# Patient Record
Sex: MALE | Race: WHITE | Employment: FULL TIME | URBAN - METROPOLITAN AREA
[De-identification: names, ages, dates, MRNs, and addresses within clinical notes are randomized per-mention and may not be internally consistent; named-entity substitution may affect disease eponyms.]

---

## 2017-05-01 PROBLEM — G25.0 ESSENTIAL TREMOR: Status: ACTIVE | Noted: 2017-05-01

## 2017-05-01 PROCEDURE — 36415 COLL VENOUS BLD VENIPUNCTURE: CPT | Performed by: INTERNAL MEDICINE

## 2017-05-01 PROCEDURE — 84402 ASSAY OF FREE TESTOSTERONE: CPT | Performed by: INTERNAL MEDICINE

## 2017-05-01 PROCEDURE — 80061 LIPID PANEL: CPT | Performed by: INTERNAL MEDICINE

## 2017-05-01 PROCEDURE — 84403 ASSAY OF TOTAL TESTOSTERONE: CPT | Performed by: INTERNAL MEDICINE

## 2017-05-01 PROCEDURE — 84153 ASSAY OF PSA TOTAL: CPT | Performed by: INTERNAL MEDICINE

## 2017-05-01 PROCEDURE — 80050 GENERAL HEALTH PANEL: CPT | Performed by: INTERNAL MEDICINE

## 2017-07-17 PROBLEM — G47.30 SLEEP APNEA, UNSPECIFIED TYPE: Status: ACTIVE | Noted: 2017-07-17

## 2017-07-20 PROBLEM — M47.812 NECK ARTHRITIS: Status: ACTIVE | Noted: 2017-07-20

## 2017-07-20 PROBLEM — G56.21: Status: ACTIVE | Noted: 2017-07-20

## 2017-07-20 PROBLEM — M54.12 CERVICAL RADICULOPATHY: Status: ACTIVE | Noted: 2017-07-20

## 2017-12-01 PROCEDURE — 88305 TISSUE EXAM BY PATHOLOGIST: CPT | Performed by: INTERNAL MEDICINE

## 2018-07-25 PROBLEM — R97.20 ELEVATED PSA: Status: ACTIVE | Noted: 2018-07-25

## 2018-08-28 PROCEDURE — 84154 ASSAY OF PSA FREE: CPT | Performed by: UROLOGY

## 2018-08-28 PROCEDURE — 36415 COLL VENOUS BLD VENIPUNCTURE: CPT | Performed by: UROLOGY

## 2018-08-28 PROCEDURE — 84153 ASSAY OF PSA TOTAL: CPT | Performed by: UROLOGY

## 2018-12-17 ENCOUNTER — HOSPITAL ENCOUNTER (EMERGENCY)
Facility: HOSPITAL | Age: 53
Discharge: HOME | End: 2018-12-17
Attending: EMERGENCY MEDICINE
Payer: OTHER MISCELLANEOUS

## 2018-12-17 VITALS
SYSTOLIC BLOOD PRESSURE: 126 MMHG | RESPIRATION RATE: 18 BRPM | OXYGEN SATURATION: 97 % | BODY MASS INDEX: 31.66 KG/M2 | HEIGHT: 76 IN | TEMPERATURE: 99 F | DIASTOLIC BLOOD PRESSURE: 70 MMHG | HEART RATE: 74 BPM | WEIGHT: 260 LBS

## 2018-12-17 DIAGNOSIS — S01.01XA SCALP LACERATION, INITIAL ENCOUNTER: Primary | ICD-10-CM

## 2018-12-17 PROCEDURE — 99283 EMERGENCY DEPT VISIT LOW MDM: CPT

## 2018-12-17 PROCEDURE — 90471 IMMUNIZATION ADMIN: CPT

## 2018-12-17 PROCEDURE — 12001 RPR S/N/AX/GEN/TRNK 2.5CM/<: CPT

## 2018-12-17 NOTE — ED INITIAL ASSESSMENT (HPI)
Patient presents to ER via medics with c/o laceration to head. Patient had stood up and hit a shelf. Denies LOC/use of blood thinners.

## 2018-12-18 NOTE — ED PROVIDER NOTES
Patient Seen in: [facility] AND [facility] Emergency Department    History   Patient presents with:  Laceration Abrasion (integumentary)    Stated Complaint: head injury    HPI    The patient is a 24-year-old male who was at work when he stood up suddenly striki Neck: Normal range of motion. Neck supple. No spinous process tenderness and no muscular tenderness present. Normal range of motion present. Musculoskeletal: Normal range of motion. Neurological: He is alert and oriented to person, place, and time.  He

## 2019-07-26 ENCOUNTER — OFFICE VISIT (OUTPATIENT)
Dept: INTERNAL MEDICINE CLINIC | Facility: CLINIC | Age: 54
End: 2019-07-26
Payer: COMMERCIAL

## 2019-07-26 ENCOUNTER — LAB ENCOUNTER (OUTPATIENT)
Dept: LAB | Age: 54
End: 2019-07-26
Attending: INTERNAL MEDICINE
Payer: COMMERCIAL

## 2019-07-26 VITALS
DIASTOLIC BLOOD PRESSURE: 80 MMHG | BODY MASS INDEX: 33.73 KG/M2 | HEART RATE: 74 BPM | SYSTOLIC BLOOD PRESSURE: 120 MMHG | TEMPERATURE: 99 F | WEIGHT: 277 LBS | HEIGHT: 75.9 IN | OXYGEN SATURATION: 98 %

## 2019-07-26 DIAGNOSIS — R97.20 ELEVATED PSA: ICD-10-CM

## 2019-07-26 DIAGNOSIS — Z00.00 PHYSICAL EXAM: ICD-10-CM

## 2019-07-26 DIAGNOSIS — E66.9 CLASS 1 OBESITY WITHOUT SERIOUS COMORBIDITY WITH BODY MASS INDEX (BMI) OF 33.0 TO 33.9 IN ADULT, UNSPECIFIED OBESITY TYPE: ICD-10-CM

## 2019-07-26 DIAGNOSIS — Z00.00 PHYSICAL EXAM: Primary | ICD-10-CM

## 2019-07-26 DIAGNOSIS — G47.30 SLEEP APNEA, UNSPECIFIED TYPE: ICD-10-CM

## 2019-07-26 DIAGNOSIS — G25.0 ESSENTIAL TREMOR: ICD-10-CM

## 2019-07-26 DIAGNOSIS — M54.12 CERVICAL RADICULOPATHY: ICD-10-CM

## 2019-07-26 PROBLEM — G47.33 OBSTRUCTIVE APNEA: Status: ACTIVE | Noted: 2017-06-28

## 2019-07-26 LAB
ALBUMIN SERPL-MCNC: 4 G/DL (ref 3.4–5)
ALBUMIN/GLOB SERPL: 1.4 {RATIO} (ref 1–2)
ALP LIVER SERPL-CCNC: 61 U/L (ref 45–117)
ALT SERPL-CCNC: 35 U/L (ref 16–61)
ANION GAP SERPL CALC-SCNC: 9 MMOL/L (ref 0–18)
AST SERPL-CCNC: 18 U/L (ref 15–37)
BASOPHILS # BLD AUTO: 0.02 X10(3) UL (ref 0–0.2)
BASOPHILS NFR BLD AUTO: 0.4 %
BILIRUB SERPL-MCNC: 0.4 MG/DL (ref 0.1–2)
BUN BLD-MCNC: 19 MG/DL (ref 7–18)
BUN/CREAT SERPL: 15.2 (ref 10–20)
CALCIUM BLD-MCNC: 9.3 MG/DL (ref 8.5–10.1)
CHLORIDE SERPL-SCNC: 109 MMOL/L (ref 98–112)
CHOLEST SMN-MCNC: 174 MG/DL (ref ?–200)
CO2 SERPL-SCNC: 26 MMOL/L (ref 21–32)
CREAT BLD-MCNC: 1.25 MG/DL (ref 0.7–1.3)
DEPRECATED RDW RBC AUTO: 41.6 FL (ref 35.1–46.3)
EOSINOPHIL # BLD AUTO: 0.09 X10(3) UL (ref 0–0.7)
EOSINOPHIL NFR BLD AUTO: 1.9 %
ERYTHROCYTE [DISTWIDTH] IN BLOOD BY AUTOMATED COUNT: 12.3 % (ref 11–15)
GLOBULIN PLAS-MCNC: 2.9 G/DL (ref 2.8–4.4)
GLUCOSE BLD-MCNC: 110 MG/DL (ref 70–99)
HCT VFR BLD AUTO: 40.8 % (ref 39–53)
HDLC SERPL-MCNC: 40 MG/DL (ref 40–59)
HGB BLD-MCNC: 13.4 G/DL (ref 13–17.5)
IMM GRANULOCYTES # BLD AUTO: 0.01 X10(3) UL (ref 0–1)
IMM GRANULOCYTES NFR BLD: 0.2 %
LDLC SERPL CALC-MCNC: 113 MG/DL (ref ?–100)
LYMPHOCYTES # BLD AUTO: 1.32 X10(3) UL (ref 1–4)
LYMPHOCYTES NFR BLD AUTO: 27.6 %
M PROTEIN MFR SERPL ELPH: 6.9 G/DL (ref 6.4–8.2)
MCH RBC QN AUTO: 30.2 PG (ref 26–34)
MCHC RBC AUTO-ENTMCNC: 32.8 G/DL (ref 31–37)
MCV RBC AUTO: 91.9 FL (ref 80–100)
MONOCYTES # BLD AUTO: 0.53 X10(3) UL (ref 0.1–1)
MONOCYTES NFR BLD AUTO: 11.1 %
NEUTROPHILS # BLD AUTO: 2.81 X10 (3) UL (ref 1.5–7.7)
NEUTROPHILS # BLD AUTO: 2.81 X10(3) UL (ref 1.5–7.7)
NEUTROPHILS NFR BLD AUTO: 58.8 %
NONHDLC SERPL-MCNC: 134 MG/DL (ref ?–130)
OSMOLALITY SERPL CALC.SUM OF ELEC: 301 MOSM/KG (ref 275–295)
PATIENT FASTING: YES
PATIENT FASTING: YES
PLATELET # BLD AUTO: 187 10(3)UL (ref 150–450)
POTASSIUM SERPL-SCNC: 3.9 MMOL/L (ref 3.5–5.1)
PSA SERPL-MCNC: 2.95 NG/ML (ref ?–4)
RBC # BLD AUTO: 4.44 X10(6)UL (ref 4.3–5.7)
SODIUM SERPL-SCNC: 144 MMOL/L (ref 136–145)
TRIGL SERPL-MCNC: 107 MG/DL (ref 30–149)
TSI SER-ACNC: 3.36 MIU/ML (ref 0.36–3.74)
VLDLC SERPL CALC-MCNC: 21 MG/DL (ref 0–30)
WBC # BLD AUTO: 4.8 X10(3) UL (ref 4–11)

## 2019-07-26 PROCEDURE — 80061 LIPID PANEL: CPT

## 2019-07-26 PROCEDURE — 85025 COMPLETE CBC W/AUTO DIFF WBC: CPT

## 2019-07-26 PROCEDURE — 84153 ASSAY OF PSA TOTAL: CPT

## 2019-07-26 PROCEDURE — 80053 COMPREHEN METABOLIC PANEL: CPT

## 2019-07-26 PROCEDURE — 36415 COLL VENOUS BLD VENIPUNCTURE: CPT

## 2019-07-26 PROCEDURE — 99386 PREV VISIT NEW AGE 40-64: CPT | Performed by: INTERNAL MEDICINE

## 2019-07-26 PROCEDURE — 84443 ASSAY THYROID STIM HORMONE: CPT

## 2019-07-26 RX ORDER — IBUPROFEN 800 MG/1
800 TABLET ORAL
Refills: 0 | COMMUNITY
Start: 2019-07-22 | End: 2020-02-25

## 2019-07-26 RX ORDER — HYDROCODONE BITARTRATE AND ACETAMINOPHEN 5; 325 MG/1; MG/1
TABLET ORAL
Refills: 0 | COMMUNITY
Start: 2019-07-22 | End: 2020-02-25

## 2019-07-26 RX ORDER — AMOXICILLIN 500 MG/1
CAPSULE ORAL
Refills: 0 | COMMUNITY
Start: 2019-07-22 | End: 2020-02-25

## 2019-07-26 NOTE — PROGRESS NOTES
HPI:    Patient ID: Porsha Bateman is a 48year old male. Patient presents to the office for continuing care. Overall he has been feeling well. However his weight has increased by approximately 4 pounds since last year.   Patient admits to not being ve of breath and wheezing. Cardiovascular: Negative for chest pain, palpitations and leg swelling. Gastrointestinal: Negative for abdominal pain, constipation, diarrhea, nausea and vomiting.    Endocrine: Negative for cold intolerance and heat intolerance intact distal pulses. No murmur heard. Pulmonary/Chest: Effort normal and breath sounds normal. No respiratory distress. He has no wheezes. He has no rales. Abdominal: Soft. Bowel sounds are normal. He exhibits no distension and no mass.  There is no t continues to increase, will need urological follow-up.     Patient is up-to-date with screening colonoscopy    Patient reminded to get seasonal flu vaccine when available    We will check routine labs    If unremarkable, patient will follow-up again in 1 ye

## 2020-11-05 ENCOUNTER — LAB ENCOUNTER (OUTPATIENT)
Dept: LAB | Age: 55
End: 2020-11-05
Attending: INTERNAL MEDICINE
Payer: COMMERCIAL

## 2020-11-05 ENCOUNTER — OFFICE VISIT (OUTPATIENT)
Dept: INTERNAL MEDICINE CLINIC | Facility: CLINIC | Age: 55
End: 2020-11-05
Payer: COMMERCIAL

## 2020-11-05 VITALS
HEART RATE: 99 BPM | BODY MASS INDEX: 33.44 KG/M2 | DIASTOLIC BLOOD PRESSURE: 84 MMHG | WEIGHT: 274.63 LBS | TEMPERATURE: 98 F | SYSTOLIC BLOOD PRESSURE: 128 MMHG | HEIGHT: 75.9 IN | RESPIRATION RATE: 16 BRPM | OXYGEN SATURATION: 99 %

## 2020-11-05 DIAGNOSIS — G25.0 ESSENTIAL TREMOR: ICD-10-CM

## 2020-11-05 DIAGNOSIS — M54.12 CERVICAL RADICULOPATHY: ICD-10-CM

## 2020-11-05 DIAGNOSIS — Z00.00 PHYSICAL EXAM: Primary | ICD-10-CM

## 2020-11-05 DIAGNOSIS — L82.1 SEBORRHEIC KERATOSES: ICD-10-CM

## 2020-11-05 DIAGNOSIS — Z00.00 PHYSICAL EXAM: ICD-10-CM

## 2020-11-05 DIAGNOSIS — R73.9 HYPERGLYCEMIA: ICD-10-CM

## 2020-11-05 DIAGNOSIS — G47.30 SLEEP APNEA, UNSPECIFIED TYPE: ICD-10-CM

## 2020-11-05 DIAGNOSIS — R97.20 ELEVATED PSA: ICD-10-CM

## 2020-11-05 PROCEDURE — 3008F BODY MASS INDEX DOCD: CPT | Performed by: INTERNAL MEDICINE

## 2020-11-05 PROCEDURE — 84443 ASSAY THYROID STIM HORMONE: CPT | Performed by: INTERNAL MEDICINE

## 2020-11-05 PROCEDURE — 80053 COMPREHEN METABOLIC PANEL: CPT

## 2020-11-05 PROCEDURE — 83036 HEMOGLOBIN GLYCOSYLATED A1C: CPT

## 2020-11-05 PROCEDURE — 3079F DIAST BP 80-89 MM HG: CPT | Performed by: INTERNAL MEDICINE

## 2020-11-05 PROCEDURE — 99072 ADDL SUPL MATRL&STAF TM PHE: CPT | Performed by: INTERNAL MEDICINE

## 2020-11-05 PROCEDURE — 85025 COMPLETE CBC W/AUTO DIFF WBC: CPT

## 2020-11-05 PROCEDURE — 90471 IMMUNIZATION ADMIN: CPT | Performed by: INTERNAL MEDICINE

## 2020-11-05 PROCEDURE — 90686 IIV4 VACC NO PRSV 0.5 ML IM: CPT | Performed by: INTERNAL MEDICINE

## 2020-11-05 PROCEDURE — 3074F SYST BP LT 130 MM HG: CPT | Performed by: INTERNAL MEDICINE

## 2020-11-05 PROCEDURE — 99396 PREV VISIT EST AGE 40-64: CPT | Performed by: INTERNAL MEDICINE

## 2020-11-05 PROCEDURE — 36415 COLL VENOUS BLD VENIPUNCTURE: CPT | Performed by: INTERNAL MEDICINE

## 2020-11-05 PROCEDURE — 80061 LIPID PANEL: CPT

## 2020-11-05 RX ORDER — MULTIVITAMIN
1 TABLET ORAL DAILY
COMMUNITY

## 2020-11-05 NOTE — PROGRESS NOTES
HPI:    Patient ID: Mushtaq Hartmann is a 47year old male. Presents for physical exam.    In general has been feeling well. Lost 3 pounds since last visit.     Has not noted any numbness or tingling in upper extremity secondary to previous history of cer environmental allergies and food allergies. Neurological: Negative for dizziness, syncope, light-headedness and headaches. Psychiatric/Behavioral: Negative for agitation and dysphoric mood. The patient is not nervous/anxious.              Current Outpat (primary encounter diagnosis)  Sleep apnea, unspecified type  Essential tremor  Cervical radiculopathy  Elevated psa  Seborrheic keratoses    Patient will continue with the dental guard for management of sleep apnea as this has been effective.   I continue

## 2020-11-06 ENCOUNTER — TELEPHONE (OUTPATIENT)
Dept: INTERNAL MEDICINE CLINIC | Facility: CLINIC | Age: 55
End: 2020-11-06

## 2020-11-06 DIAGNOSIS — R73.9 HYPERGLYCEMIA: Primary | ICD-10-CM

## 2020-11-06 DIAGNOSIS — R73.03 PREDIABETES: Primary | ICD-10-CM

## 2020-11-06 NOTE — TELEPHONE ENCOUNTER
Spoke to patient and relayed MD message and instructions, patient verbalizes understanding and agrees with plan. Provided patient with number to schedule diabetes education. Patient denies any questions at this time.

## 2020-11-06 NOTE — TELEPHONE ENCOUNTER
----- Message from Aaron Cox MD sent at 11/6/2020 11:44 AM CST -----  Hemoglobin A1c is 6.5--consistent with prediabetes/early diabetes. No medication needed at this time.   However would refer for diabetic teaching in order to prevent full progress

## 2022-01-03 ENCOUNTER — TELEPHONE (OUTPATIENT)
Dept: INTERNAL MEDICINE CLINIC | Facility: CLINIC | Age: 57
End: 2022-01-03

## 2022-01-03 NOTE — TELEPHONE ENCOUNTER
----- Message from Vimal Hunt MD sent at 1/2/2022 10:45 PM CST -----  Covid testing is positive--usual covid recommendations.

## 2022-01-03 NOTE — TELEPHONE ENCOUNTER
COVID triage:     Start of symptoms: tested at home before having people over for New Years Amanda     Fever:  []  No fever  []  Temperature  []  Chills   []  Night sweats     Cough:  [] Productive cough  [] Cough with exertion  [x] Dry cough     Breathing:

## 2022-01-04 NOTE — TELEPHONE ENCOUNTER
Patient calling asking if there is anything else he could do to help with Covid. Patient took another covid rapid test at a clinic today. Results came back positive. Took first rapid on 12/30 was positive, then PCR on 12/31 was positive.      Only has

## 2022-01-04 NOTE — TELEPHONE ENCOUNTER
Spoke to patient who reports his symptoms of a sore throat and cough are improved with mucinex. Advised adequate hydration, proper nutrition, rest, activity as tolerated. Advised tylenol for body aches or fevers PRN if no contraindication to tylenol.  Renee

## 2022-04-12 ENCOUNTER — OFFICE VISIT (OUTPATIENT)
Dept: INTERNAL MEDICINE CLINIC | Facility: CLINIC | Age: 57
End: 2022-04-12
Payer: COMMERCIAL

## 2022-04-12 ENCOUNTER — LAB ENCOUNTER (OUTPATIENT)
Dept: LAB | Age: 57
End: 2022-04-12
Attending: INTERNAL MEDICINE
Payer: COMMERCIAL

## 2022-04-12 VITALS
OXYGEN SATURATION: 97 % | HEART RATE: 88 BPM | WEIGHT: 271 LBS | SYSTOLIC BLOOD PRESSURE: 122 MMHG | TEMPERATURE: 98 F | DIASTOLIC BLOOD PRESSURE: 86 MMHG | HEIGHT: 75.3 IN | BODY MASS INDEX: 33.69 KG/M2

## 2022-04-12 DIAGNOSIS — E11.69 DIABETES MELLITUS TYPE 2 IN OBESE (HCC): ICD-10-CM

## 2022-04-12 DIAGNOSIS — G25.0 ESSENTIAL TREMOR: ICD-10-CM

## 2022-04-12 DIAGNOSIS — R97.20 ELEVATED PSA: ICD-10-CM

## 2022-04-12 DIAGNOSIS — Z00.00 PHYSICAL EXAM: Primary | ICD-10-CM

## 2022-04-12 DIAGNOSIS — E66.9 DIABETES MELLITUS TYPE 2 IN OBESE (HCC): ICD-10-CM

## 2022-04-12 DIAGNOSIS — M54.12 CERVICAL RADICULOPATHY: ICD-10-CM

## 2022-04-12 DIAGNOSIS — Z00.00 PHYSICAL EXAM: ICD-10-CM

## 2022-04-12 DIAGNOSIS — G47.33 OBSTRUCTIVE APNEA: ICD-10-CM

## 2022-04-12 LAB
ALBUMIN SERPL-MCNC: 4.3 G/DL (ref 3.4–5)
ALBUMIN/GLOB SERPL: 1.5 {RATIO} (ref 1–2)
ALP LIVER SERPL-CCNC: 70 U/L
ALT SERPL-CCNC: 39 U/L
ANION GAP SERPL CALC-SCNC: 5 MMOL/L (ref 0–18)
AST SERPL-CCNC: 23 U/L (ref 15–37)
BASOPHILS # BLD AUTO: 0.01 X10(3) UL (ref 0–0.2)
BASOPHILS NFR BLD AUTO: 0.2 %
BILIRUB SERPL-MCNC: 0.6 MG/DL (ref 0.1–2)
BUN BLD-MCNC: 15 MG/DL (ref 7–18)
BUN/CREAT SERPL: 13.4 (ref 10–20)
CALCIUM BLD-MCNC: 9.5 MG/DL (ref 8.5–10.1)
CHLORIDE SERPL-SCNC: 104 MMOL/L (ref 98–112)
CHOLEST SERPL-MCNC: 215 MG/DL (ref ?–200)
CO2 SERPL-SCNC: 28 MMOL/L (ref 21–32)
COMPLEXED PSA SERPL-MCNC: 7.57 NG/ML (ref ?–4)
CREAT BLD-MCNC: 1.12 MG/DL
CREAT UR-SCNC: 55.1 MG/DL
DEPRECATED RDW RBC AUTO: 42.4 FL (ref 35.1–46.3)
EOSINOPHIL # BLD AUTO: 0.12 X10(3) UL (ref 0–0.7)
EOSINOPHIL NFR BLD AUTO: 2.4 %
ERYTHROCYTE [DISTWIDTH] IN BLOOD BY AUTOMATED COUNT: 12.2 % (ref 11–15)
EST. AVERAGE GLUCOSE BLD GHB EST-MCNC: 128 MG/DL (ref 68–126)
FASTING PATIENT LIPID ANSWER: NO
FASTING STATUS PATIENT QL REPORTED: NO
GLOBULIN PLAS-MCNC: 2.8 G/DL (ref 2.8–4.4)
GLUCOSE BLD-MCNC: 107 MG/DL (ref 70–99)
HBA1C MFR BLD: 6.1 % (ref ?–5.7)
HCT VFR BLD AUTO: 46.8 %
HDLC SERPL-MCNC: 45 MG/DL (ref 40–59)
HGB BLD-MCNC: 14.9 G/DL
IMM GRANULOCYTES # BLD AUTO: 0.01 X10(3) UL (ref 0–1)
IMM GRANULOCYTES NFR BLD: 0.2 %
LDLC SERPL CALC-MCNC: 130 MG/DL (ref ?–100)
LYMPHOCYTES # BLD AUTO: 1.43 X10(3) UL (ref 1–4)
LYMPHOCYTES NFR BLD AUTO: 28.7 %
MCH RBC QN AUTO: 30 PG (ref 26–34)
MCHC RBC AUTO-ENTMCNC: 31.8 G/DL (ref 31–37)
MCV RBC AUTO: 94.2 FL
MICROALBUMIN UR-MCNC: <0.5 MG/DL
MONOCYTES # BLD AUTO: 0.47 X10(3) UL (ref 0.1–1)
MONOCYTES NFR BLD AUTO: 9.4 %
NEUTROPHILS # BLD AUTO: 2.95 X10 (3) UL (ref 1.5–7.7)
NEUTROPHILS # BLD AUTO: 2.95 X10(3) UL (ref 1.5–7.7)
NEUTROPHILS NFR BLD AUTO: 59.1 %
NONHDLC SERPL-MCNC: 170 MG/DL (ref ?–130)
OSMOLALITY SERPL CALC.SUM OF ELEC: 285 MOSM/KG (ref 275–295)
PLATELET # BLD AUTO: 214 10(3)UL (ref 150–450)
POTASSIUM SERPL-SCNC: 4.5 MMOL/L (ref 3.5–5.1)
PROT SERPL-MCNC: 7.1 G/DL (ref 6.4–8.2)
RBC # BLD AUTO: 4.97 X10(6)UL
SODIUM SERPL-SCNC: 137 MMOL/L (ref 136–145)
TRIGL SERPL-MCNC: 223 MG/DL (ref 30–149)
TSI SER-ACNC: 2.05 MIU/ML (ref 0.36–3.74)
VLDLC SERPL CALC-MCNC: 41 MG/DL (ref 0–30)
WBC # BLD AUTO: 5 X10(3) UL (ref 4–11)

## 2022-04-12 PROCEDURE — 36415 COLL VENOUS BLD VENIPUNCTURE: CPT

## 2022-04-12 PROCEDURE — 82043 UR ALBUMIN QUANTITATIVE: CPT

## 2022-04-12 PROCEDURE — 80061 LIPID PANEL: CPT

## 2022-04-12 PROCEDURE — 85025 COMPLETE CBC W/AUTO DIFF WBC: CPT

## 2022-04-12 PROCEDURE — 84443 ASSAY THYROID STIM HORMONE: CPT

## 2022-04-12 PROCEDURE — 3008F BODY MASS INDEX DOCD: CPT | Performed by: INTERNAL MEDICINE

## 2022-04-12 PROCEDURE — 3074F SYST BP LT 130 MM HG: CPT | Performed by: INTERNAL MEDICINE

## 2022-04-12 PROCEDURE — 80053 COMPREHEN METABOLIC PANEL: CPT

## 2022-04-12 PROCEDURE — 83036 HEMOGLOBIN GLYCOSYLATED A1C: CPT

## 2022-04-12 PROCEDURE — 3079F DIAST BP 80-89 MM HG: CPT | Performed by: INTERNAL MEDICINE

## 2022-04-12 PROCEDURE — 99396 PREV VISIT EST AGE 40-64: CPT | Performed by: INTERNAL MEDICINE

## 2022-04-12 PROCEDURE — 82570 ASSAY OF URINE CREATININE: CPT

## 2022-04-18 ENCOUNTER — TELEPHONE (OUTPATIENT)
Dept: INTERNAL MEDICINE CLINIC | Facility: CLINIC | Age: 57
End: 2022-04-18

## 2022-04-18 NOTE — TELEPHONE ENCOUNTER
----- Message from Pricilla Cueto MD sent at 4/18/2022  8:31 AM CDT -----  Patient has not reviewed my chart test result note. Please notify patient.

## 2022-06-09 ENCOUNTER — IMMUNIZATION (OUTPATIENT)
Dept: LAB | Age: 57
End: 2022-06-09
Attending: EMERGENCY MEDICINE
Payer: COMMERCIAL

## 2022-06-09 ENCOUNTER — LAB ENCOUNTER (OUTPATIENT)
Dept: LAB | Age: 57
End: 2022-06-09
Attending: EMERGENCY MEDICINE
Payer: COMMERCIAL

## 2022-06-09 DIAGNOSIS — Z23 NEED FOR VACCINATION: Primary | ICD-10-CM

## 2022-06-09 PROCEDURE — 0054A SARSCOV2 VAC 30MCG TRS SUCR: CPT

## 2022-07-07 DIAGNOSIS — R97.20 ELEVATED PSA: Primary | ICD-10-CM

## 2022-07-08 ENCOUNTER — LAB ENCOUNTER (OUTPATIENT)
Dept: LAB | Age: 57
End: 2022-07-08
Attending: UROLOGY
Payer: COMMERCIAL

## 2022-07-08 DIAGNOSIS — R97.20 ELEVATED PSA: ICD-10-CM

## 2022-07-08 LAB — PSA SERPL-MCNC: 8.92 NG/ML (ref ?–4)

## 2022-07-08 PROCEDURE — 84153 ASSAY OF PSA TOTAL: CPT

## 2022-07-08 PROCEDURE — 36415 COLL VENOUS BLD VENIPUNCTURE: CPT

## 2022-11-04 ENCOUNTER — IMMUNIZATION (OUTPATIENT)
Dept: LAB | Age: 57
End: 2022-11-04
Attending: EMERGENCY MEDICINE
Payer: COMMERCIAL

## 2022-11-04 DIAGNOSIS — Z23 NEED FOR VACCINATION: Primary | ICD-10-CM

## 2022-11-04 PROCEDURE — 0124A SARSCOV2 VAC BVL 30MCG/0.3ML: CPT

## 2022-11-21 ENCOUNTER — OFFICE VISIT (OUTPATIENT)
Dept: INTERNAL MEDICINE CLINIC | Facility: CLINIC | Age: 57
End: 2022-11-21
Payer: COMMERCIAL

## 2022-11-21 VITALS
HEART RATE: 100 BPM | OXYGEN SATURATION: 96 % | BODY MASS INDEX: 33.94 KG/M2 | WEIGHT: 273 LBS | TEMPERATURE: 98 F | HEIGHT: 75.3 IN | SYSTOLIC BLOOD PRESSURE: 130 MMHG | DIASTOLIC BLOOD PRESSURE: 90 MMHG

## 2022-11-21 DIAGNOSIS — M25.511 RIGHT SHOULDER PAIN, UNSPECIFIED CHRONICITY: Primary | ICD-10-CM

## 2022-11-21 RX ORDER — MELOXICAM 15 MG/1
15 TABLET ORAL DAILY
Qty: 30 TABLET | Refills: 1 | Status: SHIPPED | OUTPATIENT
Start: 2022-11-21

## 2022-11-28 ENCOUNTER — HOSPITAL ENCOUNTER (OUTPATIENT)
Dept: GENERAL RADIOLOGY | Facility: HOSPITAL | Age: 57
Discharge: HOME | End: 2022-11-28
Attending: INTERNAL MEDICINE
Payer: COMMERCIAL

## 2022-11-28 DIAGNOSIS — M25.511 RIGHT SHOULDER PAIN, UNSPECIFIED CHRONICITY: ICD-10-CM

## 2022-11-28 PROCEDURE — 73030 X-RAY EXAM OF SHOULDER: CPT | Performed by: INTERNAL MEDICINE

## 2022-11-28 PROCEDURE — 73000 X-RAY EXAM OF COLLAR BONE: CPT | Performed by: INTERNAL MEDICINE

## 2022-11-29 ENCOUNTER — PATIENT MESSAGE (OUTPATIENT)
Dept: INTERNAL MEDICINE CLINIC | Facility: CLINIC | Age: 57
End: 2022-11-29

## 2022-11-29 NOTE — TELEPHONE ENCOUNTER
From: Josh Ramsay  To: Clari Gold MD  Sent: 11/29/2022 9:15 AM CST  Subject: Next steps after x-ray results    Should I go ahead and schedule physical therapy? Do you need to know where so you can send test results? Thanks.

## 2022-11-30 NOTE — TELEPHONE ENCOUNTER
Order for PT faxed to [facility] at [facility] recieved  [phone] patient

## 2023-04-18 ENCOUNTER — LAB ENCOUNTER (OUTPATIENT)
Dept: LAB | Age: 58
End: 2023-04-18
Attending: INTERNAL MEDICINE
Payer: COMMERCIAL

## 2023-04-18 ENCOUNTER — OFFICE VISIT (OUTPATIENT)
Dept: INTERNAL MEDICINE CLINIC | Facility: CLINIC | Age: 58
End: 2023-04-18

## 2023-04-18 VITALS
TEMPERATURE: 99 F | BODY MASS INDEX: 34.57 KG/M2 | HEIGHT: 75.3 IN | DIASTOLIC BLOOD PRESSURE: 86 MMHG | WEIGHT: 278 LBS | HEART RATE: 96 BPM | SYSTOLIC BLOOD PRESSURE: 136 MMHG | OXYGEN SATURATION: 98 %

## 2023-04-18 DIAGNOSIS — G47.33 OBSTRUCTIVE APNEA: ICD-10-CM

## 2023-04-18 DIAGNOSIS — E11.69 DIABETES MELLITUS TYPE 2 IN OBESE (HCC): ICD-10-CM

## 2023-04-18 DIAGNOSIS — M25.511 RIGHT SHOULDER PAIN, UNSPECIFIED CHRONICITY: ICD-10-CM

## 2023-04-18 DIAGNOSIS — Z00.00 PHYSICAL EXAM: Primary | ICD-10-CM

## 2023-04-18 DIAGNOSIS — E66.9 DIABETES MELLITUS TYPE 2 IN OBESE (HCC): ICD-10-CM

## 2023-04-18 DIAGNOSIS — R97.20 ELEVATED PSA: ICD-10-CM

## 2023-04-18 DIAGNOSIS — G25.0 ESSENTIAL TREMOR: ICD-10-CM

## 2023-04-18 DIAGNOSIS — Z00.00 PHYSICAL EXAM: ICD-10-CM

## 2023-04-18 DIAGNOSIS — D22.9 ATYPICAL MOLE: ICD-10-CM

## 2023-04-18 LAB
BASOPHILS # BLD AUTO: 0.03 X10(3) UL (ref 0–0.2)
BASOPHILS NFR BLD AUTO: 0.4 %
DEPRECATED RDW RBC AUTO: 41 FL (ref 35.1–46.3)
EOSINOPHIL # BLD AUTO: 0.13 X10(3) UL (ref 0–0.7)
EOSINOPHIL NFR BLD AUTO: 1.9 %
ERYTHROCYTE [DISTWIDTH] IN BLOOD BY AUTOMATED COUNT: 12.1 % (ref 11–15)
HCT VFR BLD AUTO: 45.8 %
HGB BLD-MCNC: 14.6 G/DL
IMM GRANULOCYTES # BLD AUTO: 0.01 X10(3) UL (ref 0–1)
IMM GRANULOCYTES NFR BLD: 0.1 %
LYMPHOCYTES # BLD AUTO: 1.9 X10(3) UL (ref 1–4)
LYMPHOCYTES NFR BLD AUTO: 27.9 %
MCH RBC QN AUTO: 29.4 PG (ref 26–34)
MCHC RBC AUTO-ENTMCNC: 31.9 G/DL (ref 31–37)
MCV RBC AUTO: 92.2 FL
MONOCYTES # BLD AUTO: 0.75 X10(3) UL (ref 0.1–1)
MONOCYTES NFR BLD AUTO: 11 %
NEUTROPHILS # BLD AUTO: 3.98 X10 (3) UL (ref 1.5–7.7)
NEUTROPHILS # BLD AUTO: 3.98 X10(3) UL (ref 1.5–7.7)
NEUTROPHILS NFR BLD AUTO: 58.7 %
PLATELET # BLD AUTO: 243 10(3)UL (ref 150–450)
RBC # BLD AUTO: 4.97 X10(6)UL
WBC # BLD AUTO: 6.8 X10(3) UL (ref 4–11)

## 2023-04-18 PROCEDURE — 3075F SYST BP GE 130 - 139MM HG: CPT | Performed by: INTERNAL MEDICINE

## 2023-04-18 PROCEDURE — 80061 LIPID PANEL: CPT

## 2023-04-18 PROCEDURE — 83036 HEMOGLOBIN GLYCOSYLATED A1C: CPT

## 2023-04-18 PROCEDURE — 36415 COLL VENOUS BLD VENIPUNCTURE: CPT

## 2023-04-18 PROCEDURE — 3079F DIAST BP 80-89 MM HG: CPT | Performed by: INTERNAL MEDICINE

## 2023-04-18 PROCEDURE — 80053 COMPREHEN METABOLIC PANEL: CPT

## 2023-04-18 PROCEDURE — 3008F BODY MASS INDEX DOCD: CPT | Performed by: INTERNAL MEDICINE

## 2023-04-18 PROCEDURE — 82570 ASSAY OF URINE CREATININE: CPT

## 2023-04-18 PROCEDURE — 84443 ASSAY THYROID STIM HORMONE: CPT

## 2023-04-18 PROCEDURE — 82043 UR ALBUMIN QUANTITATIVE: CPT

## 2023-04-18 PROCEDURE — 85025 COMPLETE CBC W/AUTO DIFF WBC: CPT

## 2023-04-18 PROCEDURE — 99396 PREV VISIT EST AGE 40-64: CPT | Performed by: INTERNAL MEDICINE

## 2023-04-19 DIAGNOSIS — E78.5 HYPERLIPIDEMIA, UNSPECIFIED HYPERLIPIDEMIA TYPE: Primary | ICD-10-CM

## 2023-04-19 LAB
ALBUMIN SERPL-MCNC: 4.2 G/DL (ref 3.4–5)
ALBUMIN/GLOB SERPL: 1.2 {RATIO} (ref 1–2)
ALP LIVER SERPL-CCNC: 70 U/L
ALT SERPL-CCNC: 43 U/L
ANION GAP SERPL CALC-SCNC: 5 MMOL/L (ref 0–18)
AST SERPL-CCNC: 24 U/L (ref 15–37)
BILIRUB SERPL-MCNC: 0.5 MG/DL (ref 0.1–2)
BUN BLD-MCNC: 19 MG/DL (ref 7–18)
BUN/CREAT SERPL: 17.4 (ref 10–20)
CALCIUM BLD-MCNC: 9.3 MG/DL (ref 8.5–10.1)
CHLORIDE SERPL-SCNC: 104 MMOL/L (ref 98–112)
CHOLEST SERPL-MCNC: 210 MG/DL (ref ?–200)
CO2 SERPL-SCNC: 30 MMOL/L (ref 21–32)
COMPLEXED PSA SERPL-MCNC: 4.07 NG/ML (ref ?–4)
CREAT BLD-MCNC: 1.09 MG/DL
CREAT UR-SCNC: 77 MG/DL
EST. AVERAGE GLUCOSE BLD GHB EST-MCNC: 131 MG/DL (ref 68–126)
FASTING PATIENT LIPID ANSWER: NO
FASTING STATUS PATIENT QL REPORTED: NO
GFR SERPLBLD BASED ON 1.73 SQ M-ARVRAT: 79 ML/MIN/1.73M2 (ref 60–?)
GLOBULIN PLAS-MCNC: 3.4 G/DL (ref 2.8–4.4)
GLUCOSE BLD-MCNC: 98 MG/DL (ref 70–99)
HBA1C MFR BLD: 6.2 % (ref ?–5.7)
HDLC SERPL-MCNC: 48 MG/DL (ref 40–59)
LDLC SERPL CALC-MCNC: 134 MG/DL (ref ?–100)
MICROALBUMIN UR-MCNC: <0.5 MG/DL
NONHDLC SERPL-MCNC: 162 MG/DL (ref ?–130)
OSMOLALITY SERPL CALC.SUM OF ELEC: 290 MOSM/KG (ref 275–295)
POTASSIUM SERPL-SCNC: 4.3 MMOL/L (ref 3.5–5.1)
PROT SERPL-MCNC: 7.6 G/DL (ref 6.4–8.2)
SODIUM SERPL-SCNC: 139 MMOL/L (ref 136–145)
TRIGL SERPL-MCNC: 154 MG/DL (ref 30–149)
TSI SER-ACNC: 3.49 MIU/ML (ref 0.36–3.74)
VLDLC SERPL CALC-MCNC: 28 MG/DL (ref 0–30)

## 2023-04-19 RX ORDER — ATORVASTATIN CALCIUM 10 MG/1
10 TABLET, FILM COATED ORAL NIGHTLY
Qty: 90 TABLET | Refills: 3 | Status: SHIPPED | OUTPATIENT
Start: 2023-04-19

## 2023-05-11 ENCOUNTER — OFFICE VISIT (OUTPATIENT)
Dept: DERMATOLOGY CLINIC | Facility: CLINIC | Age: 58
End: 2023-05-11

## 2023-05-11 DIAGNOSIS — D49.2 NEOPLASM OF UNSPECIFIED BEHAVIOR OF BONE, SOFT TISSUE, AND SKIN: ICD-10-CM

## 2023-05-11 DIAGNOSIS — L81.4 LENTIGINES: Primary | ICD-10-CM

## 2023-05-11 DIAGNOSIS — L82.1 SEBORRHEIC KERATOSES: ICD-10-CM

## 2023-05-11 DIAGNOSIS — L57.0 MULTIPLE ACTINIC KERATOSES: ICD-10-CM

## 2023-05-11 PROCEDURE — 11102 TANGNTL BX SKIN SINGLE LES: CPT | Performed by: STUDENT IN AN ORGANIZED HEALTH CARE EDUCATION/TRAINING PROGRAM

## 2023-05-11 PROCEDURE — 88305 TISSUE EXAM BY PATHOLOGIST: CPT | Performed by: STUDENT IN AN ORGANIZED HEALTH CARE EDUCATION/TRAINING PROGRAM

## 2023-05-11 PROCEDURE — 17003 DESTRUCT PREMALG LES 2-14: CPT | Performed by: STUDENT IN AN ORGANIZED HEALTH CARE EDUCATION/TRAINING PROGRAM

## 2023-05-11 PROCEDURE — 99203 OFFICE O/P NEW LOW 30 MIN: CPT | Performed by: STUDENT IN AN ORGANIZED HEALTH CARE EDUCATION/TRAINING PROGRAM

## 2023-05-11 PROCEDURE — 17000 DESTRUCT PREMALG LESION: CPT | Performed by: STUDENT IN AN ORGANIZED HEALTH CARE EDUCATION/TRAINING PROGRAM

## 2023-05-31 ENCOUNTER — LAB ENCOUNTER (OUTPATIENT)
Dept: LAB | Age: 58
End: 2023-05-31
Attending: INTERNAL MEDICINE
Payer: COMMERCIAL

## 2023-05-31 DIAGNOSIS — E78.5 HYPERLIPIDEMIA, UNSPECIFIED HYPERLIPIDEMIA TYPE: ICD-10-CM

## 2023-05-31 LAB
ALBUMIN SERPL-MCNC: 4 G/DL (ref 3.4–5)
ALP LIVER SERPL-CCNC: 65 U/L
ALT SERPL-CCNC: 36 U/L
AST SERPL-CCNC: 23 U/L (ref 15–37)
BILIRUB DIRECT SERPL-MCNC: 0.1 MG/DL (ref 0–0.2)
BILIRUB SERPL-MCNC: 0.5 MG/DL (ref 0.1–2)
CHOLEST SERPL-MCNC: 146 MG/DL (ref ?–200)
FASTING PATIENT LIPID ANSWER: NO
HDLC SERPL-MCNC: 42 MG/DL (ref 40–59)
LDLC SERPL CALC-MCNC: 75 MG/DL (ref ?–100)
NONHDLC SERPL-MCNC: 104 MG/DL (ref ?–130)
PROT SERPL-MCNC: 7.4 G/DL (ref 6.4–8.2)
TRIGL SERPL-MCNC: 170 MG/DL (ref 30–149)
VLDLC SERPL CALC-MCNC: 26 MG/DL (ref 0–30)

## 2023-05-31 PROCEDURE — 36415 COLL VENOUS BLD VENIPUNCTURE: CPT

## 2023-05-31 PROCEDURE — 80061 LIPID PANEL: CPT

## 2023-05-31 PROCEDURE — 80076 HEPATIC FUNCTION PANEL: CPT

## 2023-06-01 ENCOUNTER — OFFICE VISIT (OUTPATIENT)
Dept: ORTHOPEDICS CLINIC | Facility: CLINIC | Age: 58
End: 2023-06-01

## 2023-06-01 VITALS — SYSTOLIC BLOOD PRESSURE: 129 MMHG | DIASTOLIC BLOOD PRESSURE: 84 MMHG | HEART RATE: 84 BPM

## 2023-06-01 DIAGNOSIS — M19.011 ARTHRITIS OF RIGHT ACROMIOCLAVICULAR JOINT: Primary | ICD-10-CM

## 2023-06-01 DIAGNOSIS — M25.511 RIGHT SHOULDER PAIN, UNSPECIFIED CHRONICITY: ICD-10-CM

## 2023-06-01 PROCEDURE — 20605 DRAIN/INJ JOINT/BURSA W/O US: CPT | Performed by: ORTHOPAEDIC SURGERY

## 2023-06-01 PROCEDURE — 3079F DIAST BP 80-89 MM HG: CPT | Performed by: ORTHOPAEDIC SURGERY

## 2023-06-01 PROCEDURE — 3074F SYST BP LT 130 MM HG: CPT | Performed by: ORTHOPAEDIC SURGERY

## 2023-06-01 PROCEDURE — 99244 OFF/OP CNSLTJ NEW/EST MOD 40: CPT | Performed by: ORTHOPAEDIC SURGERY

## 2023-06-01 RX ORDER — TRIAMCINOLONE ACETONIDE 40 MG/ML
40 INJECTION, SUSPENSION INTRA-ARTICULAR; INTRAMUSCULAR ONCE
Status: COMPLETED | OUTPATIENT
Start: 2023-06-01 | End: 2023-06-01

## 2023-06-01 RX ADMIN — TRIAMCINOLONE ACETONIDE 40 MG: 40 INJECTION, SUSPENSION INTRA-ARTICULAR; INTRAMUSCULAR at 15:04:00

## 2023-06-01 NOTE — PROGRESS NOTES
Per verbal order from Dr. Gurpreet Zacarias draw up 1 ml 1% lidocaine and 1ml of Kenalog 40 for cortisone injection to right shoulder Adriane Toussaint    Patient provided education handout for cortisone injection.

## 2024-02-26 ENCOUNTER — APPOINTMENT (OUTPATIENT)
Dept: GENERAL RADIOLOGY | Facility: HOSPITAL | Age: 59
End: 2024-02-26
Attending: EMERGENCY MEDICINE
Payer: COMMERCIAL

## 2024-02-26 ENCOUNTER — HOSPITAL ENCOUNTER (INPATIENT)
Facility: HOSPITAL | Age: 59
LOS: 1 days | Discharge: HOME | End: 2024-02-27
Attending: EMERGENCY MEDICINE | Admitting: INTERNAL MEDICINE
Payer: COMMERCIAL

## 2024-02-26 DIAGNOSIS — E78.5 HYPERLIPIDEMIA, UNSPECIFIED HYPERLIPIDEMIA TYPE: ICD-10-CM

## 2024-02-26 DIAGNOSIS — I48.91 ATRIAL FIBRILLATION WITH RAPID VENTRICULAR RESPONSE (HCC): Primary | ICD-10-CM

## 2024-02-26 LAB
ANION GAP SERPL CALC-SCNC: 4 MMOL/L (ref 0–18)
ANION GAP SERPL CALC-SCNC: 7 MMOL/L (ref 0–18)
APTT PPP: 24.9 SECONDS (ref 23.3–35.6)
BASOPHILS # BLD AUTO: 0.01 X10(3) UL (ref 0–0.2)
BASOPHILS NFR BLD AUTO: 0.2 %
BNP SERPL-MCNC: 21 PG/ML
BUN BLD-MCNC: 13 MG/DL (ref 9–23)
BUN BLD-MCNC: 19 MG/DL (ref 9–23)
BUN/CREAT SERPL: 14 (ref 10–20)
BUN/CREAT SERPL: 17.3 (ref 10–20)
CALCIUM BLD-MCNC: 9.4 MG/DL (ref 8.7–10.4)
CALCIUM BLD-MCNC: 9.5 MG/DL (ref 8.7–10.4)
CHLORIDE SERPL-SCNC: 107 MMOL/L (ref 98–112)
CHLORIDE SERPL-SCNC: 108 MMOL/L (ref 98–112)
CO2 SERPL-SCNC: 26 MMOL/L (ref 21–32)
CO2 SERPL-SCNC: 26 MMOL/L (ref 21–32)
CREAT BLD-MCNC: 0.93 MG/DL
CREAT BLD-MCNC: 1.1 MG/DL
DEPRECATED RDW RBC AUTO: 39.4 FL (ref 35.1–46.3)
DEPRECATED RDW RBC AUTO: 39.5 FL (ref 35.1–46.3)
EGFRCR SERPLBLD CKD-EPI 2021: 78 ML/MIN/1.73M2 (ref 60–?)
EGFRCR SERPLBLD CKD-EPI 2021: 95 ML/MIN/1.73M2 (ref 60–?)
EOSINOPHIL # BLD AUTO: 0.09 X10(3) UL (ref 0–0.7)
EOSINOPHIL NFR BLD AUTO: 2 %
ERYTHROCYTE [DISTWIDTH] IN BLOOD BY AUTOMATED COUNT: 11.9 % (ref 11–15)
ERYTHROCYTE [DISTWIDTH] IN BLOOD BY AUTOMATED COUNT: 12.1 % (ref 11–15)
GLUCOSE BLD-MCNC: 104 MG/DL (ref 70–99)
GLUCOSE BLD-MCNC: 149 MG/DL (ref 70–99)
HCT VFR BLD AUTO: 44.7 %
HCT VFR BLD AUTO: 46.7 %
HGB BLD-MCNC: 14.7 G/DL
HGB BLD-MCNC: 16 G/DL
IMM GRANULOCYTES # BLD AUTO: 0 X10(3) UL (ref 0–1)
IMM GRANULOCYTES NFR BLD: 0 %
LYMPHOCYTES # BLD AUTO: 1.23 X10(3) UL (ref 1–4)
LYMPHOCYTES NFR BLD AUTO: 27.6 %
MAGNESIUM SERPL-MCNC: 2.2 MG/DL (ref 1.6–2.6)
MCH RBC QN AUTO: 29.8 PG (ref 26–34)
MCH RBC QN AUTO: 30.2 PG (ref 26–34)
MCHC RBC AUTO-ENTMCNC: 32.9 G/DL (ref 31–37)
MCHC RBC AUTO-ENTMCNC: 34.3 G/DL (ref 31–37)
MCV RBC AUTO: 88.1 FL
MCV RBC AUTO: 90.7 FL
MONOCYTES # BLD AUTO: 0.44 X10(3) UL (ref 0.1–1)
MONOCYTES NFR BLD AUTO: 9.9 %
NEUTROPHILS # BLD AUTO: 2.68 X10 (3) UL (ref 1.5–7.7)
NEUTROPHILS # BLD AUTO: 2.68 X10(3) UL (ref 1.5–7.7)
NEUTROPHILS NFR BLD AUTO: 60.3 %
OSMOLALITY SERPL CALC.SUM OF ELEC: 290 MOSM/KG (ref 275–295)
OSMOLALITY SERPL CALC.SUM OF ELEC: 291 MOSM/KG (ref 275–295)
PLATELET # BLD AUTO: 199 10(3)UL (ref 150–450)
PLATELET # BLD AUTO: 236 10(3)UL (ref 150–450)
POTASSIUM SERPL-SCNC: 3.8 MMOL/L (ref 3.5–5.1)
POTASSIUM SERPL-SCNC: 4 MMOL/L (ref 3.5–5.1)
Q-T INTERVAL: 276 MS
QRS DURATION: 104 MS
QTC CALCULATION (BEZET): 434 MS
R AXIS: -28 DEGREES
RBC # BLD AUTO: 4.93 X10(6)UL
RBC # BLD AUTO: 5.3 X10(6)UL
SODIUM SERPL-SCNC: 138 MMOL/L (ref 136–145)
SODIUM SERPL-SCNC: 140 MMOL/L (ref 136–145)
T AXIS: 62 DEGREES
TROPONIN I SERPL HS-MCNC: 5 NG/L
VENTRICULAR RATE: 149 BPM
WBC # BLD AUTO: 4.5 X10(3) UL (ref 4–11)
WBC # BLD AUTO: 6.9 X10(3) UL (ref 4–11)

## 2024-02-26 PROCEDURE — 99223 1ST HOSP IP/OBS HIGH 75: CPT | Performed by: INTERNAL MEDICINE

## 2024-02-26 PROCEDURE — 71045 X-RAY EXAM CHEST 1 VIEW: CPT | Performed by: EMERGENCY MEDICINE

## 2024-02-26 RX ORDER — ACETAMINOPHEN 325 MG/1
650 TABLET ORAL EVERY 4 HOURS PRN
Status: DISCONTINUED | OUTPATIENT
Start: 2024-02-26 | End: 2024-02-27

## 2024-02-26 RX ORDER — HEPARIN SODIUM AND DEXTROSE 10000; 5 [USP'U]/100ML; G/100ML
INJECTION INTRAVENOUS CONTINUOUS
Status: DISCONTINUED | OUTPATIENT
Start: 2024-02-27 | End: 2024-02-27

## 2024-02-26 RX ORDER — DILTIAZEM HYDROCHLORIDE 5 MG/ML
INJECTION INTRAVENOUS
Status: COMPLETED
Start: 2024-02-26 | End: 2024-02-26

## 2024-02-26 RX ORDER — POTASSIUM CHLORIDE 20 MEQ/1
40 TABLET, EXTENDED RELEASE ORAL ONCE
Status: COMPLETED | OUTPATIENT
Start: 2024-02-26 | End: 2024-02-26

## 2024-02-26 RX ORDER — HYDROCODONE BITARTRATE AND ACETAMINOPHEN 5; 325 MG/1; MG/1
1 TABLET ORAL EVERY 4 HOURS PRN
Status: DISCONTINUED | OUTPATIENT
Start: 2024-02-26 | End: 2024-02-27

## 2024-02-26 RX ORDER — DILTIAZEM HYDROCHLORIDE 5 MG/ML
15 INJECTION INTRAVENOUS ONCE
Status: COMPLETED | OUTPATIENT
Start: 2024-02-26 | End: 2024-02-26

## 2024-02-26 RX ORDER — DOXEPIN HYDROCHLORIDE 50 MG/1
1 CAPSULE ORAL DAILY
Status: DISCONTINUED | OUTPATIENT
Start: 2024-02-26 | End: 2024-02-27

## 2024-02-26 RX ORDER — HYDROCODONE BITARTRATE AND ACETAMINOPHEN 5; 325 MG/1; MG/1
2 TABLET ORAL EVERY 4 HOURS PRN
Status: DISCONTINUED | OUTPATIENT
Start: 2024-02-26 | End: 2024-02-27

## 2024-02-26 RX ORDER — HEPARIN SODIUM AND DEXTROSE 10000; 5 [USP'U]/100ML; G/100ML
1000 INJECTION INTRAVENOUS ONCE
Status: COMPLETED | OUTPATIENT
Start: 2024-02-26 | End: 2024-02-26

## 2024-02-26 RX ORDER — HEPARIN SODIUM 5000 [USP'U]/ML
5000 INJECTION, SOLUTION INTRAVENOUS; SUBCUTANEOUS EVERY 8 HOURS SCHEDULED
Status: DISCONTINUED | OUTPATIENT
Start: 2024-02-26 | End: 2024-02-26

## 2024-02-26 RX ORDER — ACETAMINOPHEN 500 MG
500 TABLET ORAL EVERY 4 HOURS PRN
Status: DISCONTINUED | OUTPATIENT
Start: 2024-02-26 | End: 2024-02-27

## 2024-02-26 RX ORDER — HEPARIN SODIUM 1000 [USP'U]/ML
5000 INJECTION, SOLUTION INTRAVENOUS; SUBCUTANEOUS ONCE
Status: COMPLETED | OUTPATIENT
Start: 2024-02-26 | End: 2024-02-26

## 2024-02-26 RX ORDER — ATORVASTATIN CALCIUM 10 MG/1
10 TABLET, FILM COATED ORAL NIGHTLY
Status: DISCONTINUED | OUTPATIENT
Start: 2024-02-26 | End: 2024-02-27

## 2024-02-26 NOTE — ED PROVIDER NOTES
Patient Seen in: [facility] Emergency Department      History     Chief Complaint   Patient presents with    Chest Pain Angina     Stated Complaint: chest pain    Subjective:   HPI    59 y/o male healthy otherwise here for eval of palpitations of CP/SOB this am.  No recent travel.  Fine throughtout the weekend.  No cardiac hx.  No smoking.  No fever and no other assoc symptoms.    Objective:   Past Medical History:   Diagnosis Date    Diabetes mellitus type 2 in obese (HCC)     Essential tremor     Hx of colonoscopy     hyperplastic polyp x 2---12/17    Obstructive apnea 06/28/2017    AHI 56 SaO2 nadair 78 % CPAP 12 Premier              Past Surgical History:   Procedure Laterality Date    OTHER      lasik right    VASECTOMY  2-27-09    Dr. Palafox                Social History     Socioeconomic History    Marital status:    Tobacco Use    Smoking status: Never     Passive exposure: Never    Smokeless tobacco: Never   Vaping Use    Vaping Use: Never used   Substance and Sexual Activity    Alcohol use: Yes     Alcohol/week: 0.0 standard drinks of alcohol     Comment: 1-2 drinks/wk    Drug use: No   Other Topics Concern    Reaction to local anesthetic No    Pt has a pacemaker No    Pt has a defibrillator No              Review of Systems    Positive for stated complaint: chest pain  Other systems are as noted in HPI.  Constitutional and vital signs reviewed.      All other systems reviewed and negative except as noted above.    Physical Exam     ED Triage Vitals [02/26/24 0811]   /83   Pulse 91   Resp 22   Temp 98 °F (36.7 °C)   Temp src Temporal   SpO2 96 %   O2 Device None (Room air)       Current:/80   Pulse (!) 143   Temp 98 °F (36.7 °C) (Temporal)   Resp 18   Ht 193 cm (6' 4\")   Wt 124.6 kg   SpO2 99%   BMI 33.44 kg/m²         Physical Exam    Constitutional: Oriented to person, place, and time.  Appears well-developed. No distress.   Head: Normocephalic and atraumatic.   Eyes:  Conjunctivae are normal. Pupils are equal, round, and reactive to light.   Neck: Normal range of motion. Neck supple. No noted JVD  Cardiovascular: Tachy, irregular rhythm and intact and equal distal pulses.  No obvious murmur  Pulmonary/Chest: Effort normal. No respiratory distress. NO sig wheeze/rales.  Abdominal: Soft. There is no tenderness. There is no guarding.   Musculoskeletal: Normal range of motion. No edema or tenderness.   Neurological: Alert and oriented to person, place, and time. No gross focal deficits.  Skin: Skin is warm and dry.   Psychiatric: Normal mood and affect.  Behavior is normal.   Nursing note and vitals reviewed.    Differential diagnosis includes new onset rapid afib, ischemia, heart failure, electrolyte abnormality.      ED Course     Labs Reviewed   BASIC METABOLIC PANEL (8) - Abnormal; Notable for the following components:       Result Value    Glucose 149 (*)     All other components within normal limits   TROPONIN I HIGH SENSITIVITY - Normal   BNP (B TYPE NATRIURETIC PEPTIDE) - Normal   MAGNESIUM - Normal   CBC WITH DIFFERENTIAL WITH PLATELET    Narrative:     The following orders were created for panel order CBC With Differential With Platelet.  Procedure                               Abnormality         Status                     ---------                               -----------         ------                     CBC W/ DIFFERENTIAL[[id number]]                              Final result                 Please view results for these tests on the individual orders.   RAINBOW DRAW BLUE   RAINBOW DRAW GOLD   CBC W/ DIFFERENTIAL     EKG    Rate, intervals and axes as noted on EKG Report.  Rate: 149  Rhythm: Atrial Fibrillation  Reading: Rapid A-fib, rate related changes, no obvious acute ischemic changes                 XR CHEST AP PORTABLE  (CPT=71045)    Result Date: 2/26/2024  PROCEDURE: XR CHEST AP PORTABLE  (CPT=71045) TIME: 913.   COMPARISON: None.  INDICATIONS: Generalized  chest pain for a few days.  TECHNIQUE:   Single view.   FINDINGS:  CARDIAC/VASC: Normal.  No cardiac silhouette abnormality or cardiomegaly.  Unremarkable pulmonary vasculature.  MEDIAST/HEBER:   No visible mass or adenopathy. LUNGS/PLEURA: Normal.  No significant pulmonary parenchymal abnormalities.  No effusion or pleural thickening. BONES: No fracture or visible bony lesion. OTHER: Negative.          CONCLUSION: No acute cardiopulmonary abnormality.    Dictated by (CST): Edu Bloom MD on 2/26/2024 at 9:48 AM     Finalized by (CST): Edu Bloom MD on 2/26/2024 at 9:50 AM                  [facility]        Admission disposition: 2/26/2024 10:17 AM                                        Medical Decision Making  Patient given a dose of Cardizem here with some improvement.  Still little tacky so we will start on Cardizem.  I messaged and spoke with the [facility] NP.  Recommended Cardizem but no heparin for now.  They will evaluate his risk factors first.  The hospitalist will be admitting.  He is stable.  Discussed with wife and him.  Likely will get an echo today    Problems Addressed:  Atrial fibrillation with rapid ventricular response (HCC): acute illness or injury with systemic symptoms that poses a threat to life or bodily functions    Amount and/or Complexity of Data Reviewed  External Data Reviewed: ECG and notes.     Details: No prior EKGs or prior cardiac consults or echocardiograms documented in the chart on review  Labs: ordered. Decision-making details documented in ED Course.  Radiology: ordered and independent interpretation performed. Decision-making details documented in ED Course.     Details: By my review there is no obvious evidence of pulmonary edema, pleural effusion, pneumothorax or focal infiltrate on x-ray imaging.  ECG/medicine tests: ordered and independent interpretation performed. Decision-making details documented in ED Course.    Risk  Drug therapy requiring intensive monitoring for  toxicity.  Decision regarding hospitalization.    Critical Care  Total time providing critical care: minutes (I spent a total of 35 minutes of critical care time in obtaining history, performing a physical exam, bedside monitoring of interventions, collecting and interpreting tests and discussion with consultants but not including time spent performing procedures.)        Disposition and Plan     Clinical Impression:  1. Atrial fibrillation with rapid ventricular response (HCC)         Disposition:  Admit  2/26/2024 10:17 am    Follow-up:  No follow-up provider specified.        Medications Prescribed:  Current Discharge Medication List                            Hospital Problems       Present on Admission  Date Reviewed: 6/1/2023            ICD-10-CM Noted POA    * (Principal) Atrial fibrillation with rapid ventricular response (HCC) I48.91 2/26/2024 Unknown

## 2024-02-26 NOTE — CONSULTS
[facility]                                                            CONSULT NOTE      Patient Name: Bart Faustin MRN: [id number]   : 1965 CSN: [id number]       Reason for consultation:   Asked by Rusty Beal MD to provide evaluation and management of AF.    Assessment and Recommendations:     Atrial fibrillation and atrial flutter  -New diagnosis  -Insidious onset of symptoms, unclear on AF duration prior to presentation  -Rapid rates, especially when in flutter (2:1 conduction)  -We reviewed AF and AFL, risk factors, natural course, sequelae; treatment goals including symptoms control, stroke prevention, and heart failure prevention; and treatment options with both rate control and rhythm control strategies.  -He will complete an echo and nuclear stress test tomorrow.  -For stroke prevention, he will start anticoagulation.  For now, use IV heparin until testing completed and no indication for invasive procedures.  Will start a DOAC after that.  Goals, alts, risks of anticoag reviewed with him.  -For the AF, if it does not spontaneously convert by Wednesday, will plan a JANA and DCCV then.  Goals, alts, risks of this reviewed in detail and all questions answered.  -We also discussed the need to address AF risk factors, including overweight, severe SIM, possible new dx of hypertension.  -We will apply CPAP empirically tonight, and he will complete a sleep eval as an outpt.      Thank you for the consultation.    Mar Quinn MD  Cardiac EP  [facility]  2024  C5      History of present illness:   The patient is a 58 year old who came to the ED due to shortness of breath and chest pain. He had the insidious onset of this but acute exacerbation this morning, worsening with activity and exertion.  He has brief lightheadedness, as well.  No syncope.  No bleeding  problems.    ROS:   Constitutional: No fevers, chills, fatigue or night sweats.  ENT: No mouth pain, neck pain, running nose, headaches or swollen glands.  Skin: No rashes, pruritus or skin changes,  Respiratory: No cough, wheezing , + shortness of breath.  CV: + chest pain, no claudication.  Musculoskeletal: No joint pain, stiffness or swelling.  : No dysuria or hematuria.  GI: No nausea, vomiting or diarrhea. No blood in stools.  Neurologic: No seizures, tremors, weakness or numbness.    Past Medical, Surgical, Family, and Social Histories:     Past Medical History:   Diagnosis Date    Diabetes mellitus type 2 in obese (HCC)     Essential tremor     Hx of colonoscopy     hyperplastic polyp x 2---12/17    Obstructive apnea 06/28/2017    AHI 56 SaO2 nadair 78 % CPAP 12 Premier     Past Surgical History:   Procedure Laterality Date    OTHER      lasik right    VASECTOMY  2-27-09    Dr. Palafox     Family History   Problem Relation Age of Onset    Cancer Father         prostate and throat       SHX:  The patient reports that he has never smoked. He has never been exposed to tobacco smoke. He has never used smokeless tobacco. He reports current alcohol use. He reports that he does not use drugs.    Allergies:   No Known Allergies    Medications:      atorvastatin  10 mg Oral Nightly    multivitamin  1 tablet Oral Daily    heparin  5,000 Units Subcutaneous Q8H NICO      dilTIAZem 10 mg/hr (02/26/24 1614)       PHYSICAL EXAM:   Blood pressure (!) 140/98, pulse (!) 142, temperature 98.4 °F (36.9 °C), temperature source Oral, resp. rate 20, height 6' 4\" (1.93 m), weight 274 lb 11.1 oz (124.6 kg), SpO2 94%.  GEN - no distress  HEENT - normal conjunctiva, moist mucosa  Neck - supple, no LAD  Lungs - nonlabored, clear bilaterally  Heart - JVP 14 cm H2O, carotids normal; reg, tachy, nl S1/S2, no murmur, gallop, or rub; no edema  Abd - soft, nontender  Ext - arthritic changes  Neuro - A+Ox3, no facial droop or gross  deficits  Psych - cooperative, calm    LABS AND DATA:     ECG: atrial fib, 149 bpm, incomp RBBB, nospec ST abnormality    Lab Results   Component Value Date    WBC 4.5 02/26/2024    HGB 14.7 02/26/2024    HCT 44.7 02/26/2024    .0 02/26/2024    CREATSERUM 1.10 02/26/2024    BUN 19 02/26/2024     02/26/2024    K 4.0 02/26/2024     02/26/2024    CO2 26.0 02/26/2024     (H) 02/26/2024    CA 9.4 02/26/2024    ALB 4.0 05/31/2023    ALKPHO 65 05/31/2023    BILT 0.5 05/31/2023    TP 7.4 05/31/2023    AST 23 05/31/2023    ALT 36 05/31/2023    TSH 3.490 04/18/2023    PSA 8.92 (H) 07/08/2022    MG 2.2 02/26/2024       XR CHEST AP PORTABLE  (CPT=71045)    Result Date: 2/26/2024  CONCLUSION: No acute cardiopulmonary abnormality.    Dictated by (CST): Edu Bloom MD on 2/26/2024 at 9:48 AM     Finalized by (CST): Edu Bloom MD on 2/26/2024 at 9:50 AM             ------------------------------------------------------------------------------------------------------------------------------

## 2024-02-26 NOTE — H&P
[facility]  HISTORY AND PHYSICAL       Bart Faustin Patient Status:  Inpatient    1965  58 year old [facility] [id number]   Location COF14/COF14-A Attending Rusty Beal MD     PCP GERMAN PONCE MD         DATE OF ADMISSION: 2024     CHIEF COMPLAINT: Shortness of breath, chest pain    HISTORY OF PRESENT ILLNESS  This is a 58 year oldmale who presented complaining of shortness of breath and chest pain.  Patient stated symptoms started suddenly in the morning of admission.  Patient stated episodes were brief, occurred mostly with movements.  Shortness of breath seem to resolve with rest.  Patient denied feelings of palpitation, diaphoresis, nausea or vomiting.  Patient denies previous heart conditions.  Patient denies history of smoking.  At time of interview, patient states symptoms were much improved.      PAST MEDICAL HISTORY  Past Medical History:   Diagnosis Date    Diabetes mellitus type 2 in obese (HCC)     Essential tremor     Hx of colonoscopy     hyperplastic polyp x 2---    Obstructive apnea 2017    AHI 56 SaO2 nadair 78 % CPAP 12 Premier        PAST SURGICAL HISTORY  Past Surgical History:   Procedure Laterality Date    OTHER      lasik right    VASECTOMY  09    Dr. Palafox       ALLERGIES   Patient has no known allergies.    MEDICATIONS  Current Discharge Medication List        CONTINUE these medications which have NOT CHANGED    Details   atorvastatin 10 MG Oral Tab Take 1 tablet (10 mg total) by mouth nightly.  Qty: 90 tablet, Refills: 3    Associated Diagnoses: Hyperlipidemia, unspecified hyperlipidemia type      Multiple Vitamin (ONE-DAILY MULTI VITAMINS) Oral Tab Take 1 tablet by mouth daily.             SOCIAL HISTORY  Social History     Socioeconomic History    Marital status:    Tobacco Use    Smoking status: Never     Passive exposure: Never    Smokeless tobacco: Never   Vaping Use    Vaping Use: Never used   Substance and Sexual  Activity    Alcohol use: Yes     Alcohol/week: 0.0 standard drinks of alcohol     Comment: 1-2 drinks/wk    Drug use: No   Other Topics Concern    Reaction to local anesthetic No    Pt has a pacemaker No    Pt has a defibrillator No       FAMILY HISTORY  Family History   Problem Relation Age of Onset    Cancer Father         prostate and throat       PHYSICAL EXAM  Vital signs:  BP (!) 127/95 (BP Location: Right arm)   Pulse 120   Temp 98 °F (36.7 °C) (Oral)   Resp 16   Ht 6' 4\" (1.93 m)   Wt 274 lb 11.1 oz (124.6 kg)   SpO2 96%   BMI 33.44 kg/m²      GENERAL:  Awake and alert, in no acute distress.  HEART: Irregular rhythm.  Mild tachycardia.  LUNGS:  Air entry was minimally decreased.  No increased work of breathing or wheezes   ABDOMEN: Soft and non-tender.    PSYCHIATRIC: Normal mood      IMAGING  XR CHEST AP PORTABLE  (CPT=71045)    Result Date: 2/26/2024  CONCLUSION: No acute cardiopulmonary abnormality.    Dictated by (CST): Edu Bloom MD on 2/26/2024 at 9:48 AM     Finalized by (CST): Edu Bloom MD on 2/26/2024 at 9:50 AM           Data:  Recent Labs   Lab 02/26/24  0853   RBC 4.93   HGB 14.7   HCT 44.7   MCV 90.7   MCH 29.8   MCHC 32.9   RDW 11.9   NEPRELIM 2.68   WBC 4.5   .0     Recent Labs   Lab 02/26/24  0853   *   BUN 19   CREATSERUM 1.10   CA 9.4      K 4.0      CO2 26.0       ASSESSMENT/PLAN      Atrial fibrillation with rapid ventricular response   -New Onset  -Patient presenting with shortness of breath and chest pain  -EKG in the ED noting atrial fibrillation with rapid ventricular response.  -Started on IV Cardizem with improvement of rates.  -Telemetry monitoring.   -Initial troponin negative, continue trending   -Cardiology consulted, appreciate recs.   -Consider further ischemic evaluation.     Hyperglycemia  -Previous A1c noted to be 6.2  -Patient counseled on lifestyle modifications  -Continue to monitor and add agents as  needed    Hyperlipidemia  -Continue statin    Plan of care discussed with patient and wife at bedside.  Discussed with ED physician and RN. Decision made that pt needs hospitalization for further management/monitoring.      Rusty Beal MD    This note was prepared using Dragon Medical voice recognition dictation software. As a result errors may occur. When identified these errors have been corrected. While every attempt is made to correct errors during dictation discrepancies may still exist

## 2024-02-26 NOTE — PLAN OF CARE
Patient is aox4; ra; contx2; self. Came in with chest pain and shortness of breath, denies any pain/dyspnea currently. On cardizem gtt. Has been diagnosed with SIM but does not wear CPAP at home. Hx of chronic tremor in hands. Per cardiology, plan for stress test tomorrow. Heparin gtt started. Respiratory to eval for CPAP.     Problem: CARDIOVASCULAR - ADULT  Goal: Maintains optimal cardiac output and hemodynamic stability  Description: INTERVENTIONS:  - Monitor vital signs, rhythm, and trends  - Monitor for bleeding, hypotension and signs of decreased cardiac output  - Evaluate effectiveness of vasoactive medications to optimize hemodynamic stability  - Monitor arterial and/or venous puncture sites for bleeding and/or hematoma  - Assess quality of pulses, skin color and temperature  - Assess for signs of decreased coronary artery perfusion - ex. Angina  - Evaluate fluid balance, assess for edema, trend weights  Outcome: Progressing  Goal: Absence of cardiac arrhythmias or at baseline  Description: INTERVENTIONS:  - Continuous cardiac monitoring, monitor vital signs, obtain 12 lead EKG if indicated  - Evaluate effectiveness of antiarrhythmic and heart rate control medications as ordered  - Initiate emergency measures for life threatening arrhythmias  - Monitor electrolytes and administer replacement therapy as ordered  Outcome: Progressing     Problem: METABOLIC/FLUID AND ELECTROLYTES - ADULT  Goal: Electrolytes maintained within normal limits  Description: INTERVENTIONS:  - Monitor labs and rhythm and assess patient for signs and symptoms of electrolyte imbalances  - Administer electrolyte replacement as ordered  - Monitor response to electrolyte replacements, including rhythm and repeat lab results as appropriate  - Fluid restriction as ordered  - Instruct patient on fluid and nutrition restrictions as appropriate  Outcome: Progressing     Problem: HEMATOLOGIC - ADULT  Goal: Maintains hematologic  stability  Description: INTERVENTIONS  - Assess for signs and symptoms of bleeding or hemorrhage  - Monitor labs and vital signs for trends  - Administer supportive blood products/factors, fluids and medications as ordered and appropriate  - Administer supportive blood products/factors as ordered and appropriate  Outcome: Progressing  Goal: Free from bleeding injury  Description: (Example usage: patient with low platelets)  INTERVENTIONS:  - Avoid intramuscular injections, enemas and rectal medication administration  - Ensure safe mobilization of patient  - Hold pressure on venipuncture sites to achieve adequate hemostasis  - Assess for signs and symptoms of internal bleeding  - Monitor lab trends  - Patient is to report abnormal signs of bleeding to staff  - Avoid use of toothpicks and dental floss  - Use electric shaver for shaving  - Use soft bristle tooth brush  - Limit straining and forceful nose blowing  Outcome: Progressing

## 2024-02-26 NOTE — ED INITIAL ASSESSMENT (HPI)
Patient arrives to ER for evaluation of Chest pain since AM. Patient endorses sharp pain in middle of chest.     No pertinent hx.,     EKG showing Afib

## 2024-02-27 ENCOUNTER — APPOINTMENT (OUTPATIENT)
Dept: CV DIAGNOSTICS | Facility: HOSPITAL | Age: 59
End: 2024-02-27
Attending: INTERNAL MEDICINE
Payer: COMMERCIAL

## 2024-02-27 ENCOUNTER — APPOINTMENT (OUTPATIENT)
Dept: NUCLEAR MEDICINE | Facility: HOSPITAL | Age: 59
End: 2024-02-27
Attending: INTERNAL MEDICINE
Payer: COMMERCIAL

## 2024-02-27 VITALS
TEMPERATURE: 98 F | SYSTOLIC BLOOD PRESSURE: 138 MMHG | OXYGEN SATURATION: 96 % | DIASTOLIC BLOOD PRESSURE: 92 MMHG | RESPIRATION RATE: 20 BRPM | WEIGHT: 265.38 LBS | BODY MASS INDEX: 32.31 KG/M2 | HEART RATE: 77 BPM | HEIGHT: 76 IN

## 2024-02-27 LAB
ANION GAP SERPL CALC-SCNC: 5 MMOL/L (ref 0–18)
APTT PPP: 38.2 SECONDS (ref 23.3–35.6)
APTT PPP: 42.5 SECONDS (ref 23.3–35.6)
BASOPHILS # BLD AUTO: 0.03 X10(3) UL (ref 0–0.2)
BASOPHILS NFR BLD AUTO: 0.4 %
BUN BLD-MCNC: 16 MG/DL (ref 9–23)
BUN/CREAT SERPL: 14.5 (ref 10–20)
CALCIUM BLD-MCNC: 9.7 MG/DL (ref 8.7–10.4)
CHLORIDE SERPL-SCNC: 106 MMOL/L (ref 98–112)
CHOLEST SERPL-MCNC: 204 MG/DL (ref ?–200)
CO2 SERPL-SCNC: 27 MMOL/L (ref 21–32)
CREAT BLD-MCNC: 1.1 MG/DL
DEPRECATED RDW RBC AUTO: 39.6 FL (ref 35.1–46.3)
EGFRCR SERPLBLD CKD-EPI 2021: 78 ML/MIN/1.73M2 (ref 60–?)
EOSINOPHIL # BLD AUTO: 0.09 X10(3) UL (ref 0–0.7)
EOSINOPHIL NFR BLD AUTO: 1.2 %
ERYTHROCYTE [DISTWIDTH] IN BLOOD BY AUTOMATED COUNT: 12.1 % (ref 11–15)
GLUCOSE BLD-MCNC: 118 MG/DL (ref 70–99)
HCT VFR BLD AUTO: 47.5 %
HDLC SERPL-MCNC: 46 MG/DL (ref 40–59)
HGB BLD-MCNC: 16.4 G/DL
IMM GRANULOCYTES # BLD AUTO: 0.02 X10(3) UL (ref 0–1)
IMM GRANULOCYTES NFR BLD: 0.3 %
LDLC SERPL CALC-MCNC: 136 MG/DL (ref ?–100)
LYMPHOCYTES # BLD AUTO: 2.11 X10(3) UL (ref 1–4)
LYMPHOCYTES NFR BLD AUTO: 28.4 %
MAGNESIUM SERPL-MCNC: 2.3 MG/DL (ref 1.6–2.6)
MCH RBC QN AUTO: 31.1 PG (ref 26–34)
MCHC RBC AUTO-ENTMCNC: 34.5 G/DL (ref 31–37)
MCV RBC AUTO: 90.1 FL
MONOCYTES # BLD AUTO: 0.58 X10(3) UL (ref 0.1–1)
MONOCYTES NFR BLD AUTO: 7.8 %
NEUTROPHILS # BLD AUTO: 4.6 X10 (3) UL (ref 1.5–7.7)
NEUTROPHILS # BLD AUTO: 4.6 X10(3) UL (ref 1.5–7.7)
NEUTROPHILS NFR BLD AUTO: 61.9 %
NONHDLC SERPL-MCNC: 158 MG/DL (ref ?–130)
OSMOLALITY SERPL CALC.SUM OF ELEC: 288 MOSM/KG (ref 275–295)
PLATELET # BLD AUTO: 240 10(3)UL (ref 150–450)
PLATELET # BLD AUTO: 240 10(3)UL (ref 150–450)
POTASSIUM SERPL-SCNC: 4.1 MMOL/L (ref 3.5–5.1)
POTASSIUM SERPL-SCNC: 4.1 MMOL/L (ref 3.5–5.1)
RBC # BLD AUTO: 5.27 X10(6)UL
SODIUM SERPL-SCNC: 138 MMOL/L (ref 136–145)
TRIGL SERPL-MCNC: 121 MG/DL (ref 30–149)
VLDLC SERPL CALC-MCNC: 22 MG/DL (ref 0–30)
WBC # BLD AUTO: 7.4 X10(3) UL (ref 4–11)

## 2024-02-27 PROCEDURE — 99239 HOSP IP/OBS DSCHRG MGMT >30: CPT | Performed by: INTERNAL MEDICINE

## 2024-02-27 PROCEDURE — 78452 HT MUSCLE IMAGE SPECT MULT: CPT | Performed by: INTERNAL MEDICINE

## 2024-02-27 PROCEDURE — 93306 TTE W/DOPPLER COMPLETE: CPT | Performed by: INTERNAL MEDICINE

## 2024-02-27 PROCEDURE — 5A09357 ASSISTANCE WITH RESPIRATORY VENTILATION, LESS THAN 24 CONSECUTIVE HOURS, CONTINUOUS POSITIVE AIRWAY PRESSURE: ICD-10-PCS | Performed by: INTERNAL MEDICINE

## 2024-02-27 PROCEDURE — 93017 CV STRESS TEST TRACING ONLY: CPT | Performed by: INTERNAL MEDICINE

## 2024-02-27 RX ORDER — HEPARIN SODIUM 1000 [USP'U]/ML
3000 INJECTION, SOLUTION INTRAVENOUS; SUBCUTANEOUS ONCE
Status: COMPLETED | OUTPATIENT
Start: 2024-02-27 | End: 2024-02-27

## 2024-02-27 RX ORDER — METOPROLOL SUCCINATE 25 MG/1
12.5 TABLET, EXTENDED RELEASE ORAL
Qty: 30 TABLET | Refills: 0 | Status: SHIPPED | OUTPATIENT
Start: 2024-02-28

## 2024-02-27 RX ORDER — REGADENOSON 0.08 MG/ML
INJECTION, SOLUTION INTRAVENOUS
Status: COMPLETED
Start: 2024-02-27 | End: 2024-02-27

## 2024-02-27 NOTE — PLAN OF CARE
Medications given per MAR. Heparin gtt running. Patient converted to SR. Service notified. Will continue to monitor and follow plan of care.     Patient down for stress test at 0655. Heparin sign off to be down upon patient's return to room.     Problem: Patient Centered Care  Goal: Patient preferences are identified and integrated in the patient's plan of care  Description: Interventions:  - What would you like us to know as we care for you? I've never had this happen before.     - Provide timely, complete, and accurate information to patient/family  - Incorporate patient and family knowledge, values, beliefs, and cultural backgrounds into the planning and delivery of care  - Encourage patient/family to participate in care and decision-making at the level they choose  - Honor patient and family perspectives and choices  Outcome: Progressing     Problem: Patient/Family Goals  Goal: Patient/Family Long Term Goal  Description: Patient's Long Term Goal: to go home    Interventions:  - See additional Care Plan goals for specific interventions  Outcome: Progressing  Goal: Patient/Family Short Term Goal  Description: Patient's Short Term Goal: to be ok    Interventions:   - See additional Care Plan goals for specific interventions  Outcome: Progressing     Problem: CARDIOVASCULAR - ADULT  Goal: Maintains optimal cardiac output and hemodynamic stability  Description: INTERVENTIONS:  - Monitor vital signs, rhythm, and trends  - Monitor for bleeding, hypotension and signs of decreased cardiac output  - Evaluate effectiveness of vasoactive medications to optimize hemodynamic stability  - Monitor arterial and/or venous puncture sites for bleeding and/or hematoma  - Assess quality of pulses, skin color and temperature  - Assess for signs of decreased coronary artery perfusion - ex. Angina  - Evaluate fluid balance, assess for edema, trend weights  Outcome: Progressing  Goal: Absence of cardiac arrhythmias or at  baseline  Description: INTERVENTIONS:  - Continuous cardiac monitoring, monitor vital signs, obtain 12 lead EKG if indicated  - Evaluate effectiveness of antiarrhythmic and heart rate control medications as ordered  - Initiate emergency measures for life threatening arrhythmias  - Monitor electrolytes and administer replacement therapy as ordered  Outcome: Progressing     Problem: METABOLIC/FLUID AND ELECTROLYTES - ADULT  Goal: Electrolytes maintained within normal limits  Description: INTERVENTIONS:  - Monitor labs and rhythm and assess patient for signs and symptoms of electrolyte imbalances  - Administer electrolyte replacement as ordered  - Monitor response to electrolyte replacements, including rhythm and repeat lab results as appropriate  - Fluid restriction as ordered  - Instruct patient on fluid and nutrition restrictions as appropriate  Outcome: Progressing     Problem: HEMATOLOGIC - ADULT  Goal: Maintains hematologic stability  Description: INTERVENTIONS  - Assess for signs and symptoms of bleeding or hemorrhage  - Monitor labs and vital signs for trends  - Administer supportive blood products/factors, fluids and medications as ordered and appropriate  - Administer supportive blood products/factors as ordered and appropriate  Outcome: Progressing  Goal: Free from bleeding injury  Description: (Example usage: patient with low platelets)  INTERVENTIONS:  - Avoid intramuscular injections, enemas and rectal medication administration  - Ensure safe mobilization of patient  - Hold pressure on venipuncture sites to achieve adequate hemostasis  - Assess for signs and symptoms of internal bleeding  - Monitor lab trends  - Patient is to report abnormal signs of bleeding to staff  - Avoid use of toothpicks and dental floss  - Use electric shaver for shaving  - Use soft bristle tooth brush  - Limit straining and forceful nose blowing  Outcome: Progressing

## 2024-02-27 NOTE — CM/SW NOTE
MDO for Anticoagulant pricing.    Pt pharmacy will need a paper script due to system compromise.  Pt supplied with savings coupon.    SW/CM to remain available for support and/or discharge planning.      HONEY Coppola, LSW  Social Work   Ext:#[phone]

## 2024-02-27 NOTE — PROGRESS NOTES
Progress Note  Bart Faustin Patient Status:  Inpatient    1965 MRN [id number]   Location [facility] 3W/SW Attending Rusty Beal MD   Hosp Day # 1 PCP GERMAN PONCE MD       CARDIOLOGIST ADDENDUM:    I agree with the findings above.  I have personally performed the Assessment and Plan in its entirety, as detailed below:    Paroxysmal AF  -converted to SR this am    HTN  -BP elevated while here, new diagnosis    HLD  -    SIM  -scheduled for sleep study as outpatient in next few weeks    Plan:  -Start eliquis 5 BID  -Start metoprolol succinate 12.5mg daily  -Sleep study as outpatient  -See me in 2 weeks      Mar Quinn M.D.   Cardiology/Electrophysiology   2024  L3  -----------------------------------------      Subjective:  Pt up ambulating in hallway    Objective:  BP (!) 124/91 (BP Location: Right arm)   Pulse 83   Temp 97.8 °F (36.6 °C) (Oral)   Resp 20   Ht 6' 4\" (1.93 m)   Wt 265 lb 6.4 oz (120.4 kg)   SpO2 95%   BMI 32.31 kg/m²     Telemetry: NSR      Intake/Output:    Intake/Output Summary (Last 24 hours) at 2024 1036  Last data filed at 2024 0400  Gross per 24 hour   Intake 790 ml   Output --   Net 790 ml       Last 3 Weights   24 0319 265 lb 6.4 oz (120.4 kg)   24 0921 274 lb 11.1 oz (124.6 kg)   24 0811 270 lb (122.5 kg)   23 1520 278 lb (126.1 kg)   22 1449 273 lb (123.8 kg)       Labs:  Recent Labs   Lab 24  0853 24  1801 24  0953   * 104* 118*   BUN 19 13 16   CREATSERUM 1.10 0.93 1.10   EGFRCR 78 95 78   CA 9.4 9.5 9.7    140 138   K 4.0 3.8 4.1  4.1    107 106   CO2 26.0 26.0 27.0     Recent Labs   Lab 24  0853 24  1801 24  0953   RBC 4.93 5.30 5.27   HGB 14.7 16.0 16.4   HCT 44.7 46.7 47.5   MCV 90.7 88.1 90.1   MCH 29.8 30.2 31.1   MCHC 32.9 34.3 34.5   RDW 11.9 12.1 12.1   NEPRELIM 2.68  --  4.60   WBC 4.5 6.9 7.4   .0 236.0 240.0  240.0          Recent Labs   Lab 02/26/24  0853   TROPHS 5     No results found for: \"PT\", \"INR\"    Diagnostics:       Review of Systems   Respiratory: Negative.     Cardiovascular: Negative.        Physical Exam:    Physical Exam  Vitals reviewed.   Constitutional:       Appearance: He is obese.   Neck:      Vascular: No JVD.   Cardiovascular:      Rate and Rhythm: Normal rate and regular rhythm.      Heart sounds: S1 normal and S2 normal. Heart sounds not distant. No murmur heard.     No friction rub. No gallop.   Abdominal:      General: Abdomen is flat. Bowel sounds are normal.      Palpations: Abdomen is soft.      Tenderness: There is no abdominal tenderness.   Musculoskeletal:      Cervical back: Normal range of motion.      Right lower leg: No edema.      Left lower leg: No edema.   Skin:     General: Skin is warm and dry.   Neurological:      Mental Status: He is alert.         Medications:   atorvastatin  10 mg Oral Nightly    multivitamin  1 tablet Oral Daily      continuous dose heparin 1,400 Units/hr (02/27/24 1032)    dilTIAZem Stopped (02/27/24 0400)       Assessment/Plan:    Afib RVR  - pt on heparin gtt  - was on cardizem gtt  - converted to NSR this am and cardizem gtt was stopped    HTN  - noted with multiple elevated blood pressure readings  - not on any medications at home    HLD  LDL of 136    SIM  - pt scheduled for sleep study as outpatient in next few weeks    Plan:  - stop heparin and start on eliquis  - start metoprolol succinate 12.5mg   - sleep study as outpatient  - discussed with pt regarding lifestyle modifications  - follow up with Dr Qiunn in 1-2 weeks.    Plan discussed with pt and RN          RAMA Drake  [facility]  2/27/2024  10:36 AM

## 2024-02-27 NOTE — DISCHARGE SUMMARY
[facility]    Discharge Summary    Bart Faustin Patient Status:  Inpatient    1965 MRN [id number]   Location [facility] 3W/SW Attending Rusty Beal MD   Hosp Day # 1 PCP GERMAN PONCE MD     Date of Admission: 2024     Date of Discharge: 24      Lace+ Score: 36  59-90 High Risk  29-58 Medium Risk  0-28   Low Risk.    TCM Follow-Up Recommendation:  LACE 29-58: Moderate Risk of readmission after discharge from the hospital.    DISCHARGE DX: Principal Problem:    Atrial fibrillation with rapid ventricular response (HCC)       The patient was seen and examined on day of discharge and this discharge summary is in conjunction with any daily progress note from day of discharge.    HPI per admitting physician: \"This is a 58 year oldmale who presented complaining of shortness of breath and chest pain.  Patient stated symptoms started suddenly in the morning of admission.  Patient stated episodes were brief, occurred mostly with movements.  Shortness of breath seem to resolve with rest.  Patient denied feelings of palpitation, diaphoresis, nausea or vomiting.  Patient denies previous heart conditions.  Patient denies history of smoking.  At time of interview, patient states symptoms were much improved. \"    Hospital Course:       Atrial fibrillation with rapid ventricular response   -New Onset  -Patient presenting with shortness of breath and chest pain  -EKG in the ED noting atrial fibrillation with rapid ventricular response.  -Started on IV Cardizem with improvement of rates.  -Converted to SR   -Started on Eliquis for AC  -Metoprolol for rate control  -Cardiology consulted, rec sleep study as outpt and follow up in clinic     Hyperglycemia  -Previous A1c noted to be 6.2  -Patient counseled on lifestyle modifications  -Continue to monitor and add agents as needed     Hyperlipidemia  -Continue statin      Physical Exam:    Vitals:    24 0319 24 0400  02/27/24 0700 02/27/24 0956   BP:    (!) 124/91   BP Location:    Right arm   Pulse:  82 88 83   Resp:    20   Temp:    97.8 °F (36.6 °C)   TempSrc:    Oral   SpO2:    95%   Weight: 265 lb 6.4 oz (120.4 kg)      Height:         Patient Weight for the past 72 hrs:   Weight   02/26/24 0811 270 lb (122.5 kg)   02/26/24 0921 274 lb 11.1 oz (124.6 kg)   02/27/24 0319 265 lb 6.4 oz (120.4 kg)       Intake/Output Summary (Last 24 hours) at 2/27/2024 1331  Last data filed at 2/27/2024 1116  Gross per 24 hour   Intake 800 ml   Output --   Net 800 ml         GENERAL:  Awake and alert, in no acute distress.  HEART:  S1 and S2 heard.  RRR   LUNGS:  Air entry was good.  No crackles or wheezes   ABDOMEN: Soft and non-tender.    PSYCHIATRIC: Normal mood    CULTURE:   No results found for this visit on 02/26/24.    IMAGING STUDIES: SOME MAY NEED FOLLOW UP WITH PCP   CARD NUC STRESS TEST LEXISCAN (CJX=[id number]/)    Result Date: 2/27/2024  CONCLUSION:  1. Normal myocardial perfusion study. 2. Normal ECG response to Lexiscan. 3. Calculated LVEF 65%.     Dictated by (CST): Neel Cordova MD on 2/27/2024 at 10:16 AM     Finalized by (CST): Neel Cordova MD on 2/27/2024 at 10:18 AM          XR CHEST AP PORTABLE  (CPT=71045)    Result Date: 2/26/2024  CONCLUSION: No acute cardiopulmonary abnormality.    Dictated by (CST): Edu Bloom MD on 2/26/2024 at 9:48 AM     Finalized by (CST): Edu Bloom MD on 2/26/2024 at 9:50 AM           LABS :     Lab Results   Component Value Date    WBC 7.4 02/27/2024    HGB 16.4 02/27/2024    HCT 47.5 02/27/2024    .0 02/27/2024    .0 02/27/2024    CREATSERUM 1.10 02/27/2024    BUN 16 02/27/2024     02/27/2024    K 4.1 02/27/2024    K 4.1 02/27/2024     02/27/2024    CO2 27.0 02/27/2024     (H) 02/27/2024    CA 9.7 02/27/2024    ALB 4.0 05/31/2023    ALKPHO 65 05/31/2023    BILT 0.5 05/31/2023    TP 7.4 05/31/2023    AST 23 05/31/2023    ALT 36 05/31/2023     PTT 42.5 (H) 02/27/2024    TSH 3.490 04/18/2023    PSA 8.92 (H) 07/08/2022    MG 2.3 02/27/2024       Recent Labs   Lab 02/26/24  0853 02/26/24  1801 02/27/24  0953   RBC 4.93 5.30 5.27   HGB 14.7 16.0 16.4   HCT 44.7 46.7 47.5   MCV 90.7 88.1 90.1   MCH 29.8 30.2 31.1   MCHC 32.9 34.3 34.5   RDW 11.9 12.1 12.1   NEPRELIM 2.68  --  4.60   WBC 4.5 6.9 7.4   .0 236.0 240.0  240.0     Recent Labs   Lab 02/26/24  0853 02/26/24  1801 02/27/24  0953   * 104* 118*   BUN 19 13 16   CREATSERUM 1.10 0.93 1.10   CA 9.4 9.5 9.7    140 138   K 4.0 3.8 4.1  4.1    107 106   CO2 26.0 26.0 27.0     No results found for: \"PT\", \"INR\"    Disposition: Discharge to Home    Condition at Discharge: Stable     Discharge Medications:      Discharge Medications        START taking these medications        Instructions Prescription details   apixaban 5 MG Tabs  Commonly known as: Eliquis      Take 1 tablet (5 mg total) by mouth 2 (two) times daily.   Quantity: 60 tablet  Refills: 0     metoprolol succinate ER 25 MG Tb24  Commonly known as: Toprol XL  Start taking on: February 28, 2024      Take 0.5 tablets (12.5 mg total) by mouth Daily Beta Blocker.   Quantity: 30 tablet  Refills: 0            CONTINUE taking these medications        Instructions Prescription details   atorvastatin 10 MG Tabs  Commonly known as: Lipitor      Take 1 tablet (10 mg total) by mouth nightly.   Quantity: 90 tablet  Refills: 3     One-Daily Multi Vitamins Tabs      Take 1 tablet by mouth daily.   Refills: 0               Where to Get Your Medications        These medications were sent to [id number] - [facility] [address] [facility] [phone], [phone]  [address] [place] [id number]      Phone: [phone]   metoprolol succinate ER 25 MG Tb24       Please  your prescriptions at the location directed by your doctor or nurse    Bring a paper prescription for each of these medications  apixaban 5 MG Tabs          Follow up Visits  No follow-up provider specified.  GERMAN PONCE MD    Consultants         Provider   Role Specialty     Rocky Hermosillo MD  Consulting Physician Interventional, Cardiology              Other Discharge Instructions:       ----------------------------------------------------  35 MIN SPENT ON THIS DC   Rusty Beal MD    2/27/2024

## 2024-02-28 ENCOUNTER — TELEPHONE (OUTPATIENT)
Dept: INTERNAL MEDICINE CLINIC | Facility: CLINIC | Age: 59
End: 2024-02-28

## 2024-02-28 ENCOUNTER — PATIENT OUTREACH (OUTPATIENT)
Dept: CASE MANAGEMENT | Age: 59
End: 2024-02-28

## 2024-02-28 DIAGNOSIS — I48.91 ATRIAL FIBRILLATION WITH RAPID VENTRICULAR RESPONSE (HCC): ICD-10-CM

## 2024-02-28 DIAGNOSIS — Z02.9 ENCOUNTERS FOR ADMINISTRATIVE PURPOSE: Primary | ICD-10-CM

## 2024-02-28 LAB
% OF MAX PREDICTED HR: 100 %
MAX DIASTOLIC BP: 69 MMHG
MAX HEART RATE: 94 BPM
MAX PREDICTED HEART RATE: 162 BPM
MAX SYSTOLIC BP: 130 MMHG
MAX WORK LOAD: 10

## 2024-02-28 PROCEDURE — 1111F DSCHRG MED/CURRENT MED MERGE: CPT

## 2024-02-28 NOTE — TELEPHONE ENCOUNTER
Spoke to patient for TCM today.  Patient does not have an appointment scheduled at this time. [facility] attempted to schedule- The patient declined at this time. The patient stated since he previously was seeing Dr. Galaviz- he is looking to establish care with a new PCP. NCM offered assistance with scheduling a new patient appointment and pt declined stating he prefers to research doctors on his own and find one.    BOOK BY DATE (last date for TCM): 03/12/2024    Clinical staff:  Please advise as patient declined to schedule- Per patient he is looking for a new PCP. Thank you!

## 2024-02-28 NOTE — PROGRESS NOTES
Initial Post Discharge Follow Up   Discharge Date: 2/27/24  Contact Date: 2/28/2024    Consent Verification:  Assessment Completed With: Patient  HIPAA Verified?  Yes    Discharge Dx:     Atrial fibrillation with rapid ventricular response       General:   How have you been since your discharge from the hospital?   Patient reports doing well. The patient denies dizziness, lightheadedness, syncope/near syncope, palpitations. The patient denies chest pain/ pain radiating from chest to neck, jaw, or shoulders or SOB. The patient reports his HR today at 80 and not fluctuating. The patient had various questions about AFIB- NCM provided education on this and answered the patient's questions. Patient questioned if he can take NSAIDS like Ibuprofen- NCM advised patient to avoid NSAIDS with blood thinner use and reasoning. NCM advised to take Tylenol for pain.       Do you have any pain since discharge?  No      How well was your pain managed while in the hospital?   On a scale of 1-5   1- Very Poor and 5- Very well   Very Well  When you were leaving the hospital were your discharge instructions reviewed with you? Yes  How well were your discharge instructions explained to you?   On a scale of 1-5   1- Very Poor and 5- Very well   Very Well  Do you have any questions about your discharge instructions?  No  Before leaving the hospital was your diagnoses explained to you? Yes  Do you have any questions about your diagnoses? Yes  Are you able to perform normal daily activities of living as you have prior to your hospital stay (dressing, bathing, ambulating to the bathroom, etc)? yes  (NCM) Was patient given a different diet per AVS? yes  If so, which diet?  Cardiac diet  Are there any barriers to following this diet? no    Medications:   Current Outpatient Medications   Medication Sig Dispense Refill    apixaban 5 MG Oral Tab Take 1 tablet (5 mg total) by mouth 2 (two) times daily. 60 tablet 0    metoprolol succinate ER 25 MG  Oral Tablet 24 Hr Take 0.5 tablets (12.5 mg total) by mouth Daily Beta Blocker. 30 tablet 0    atorvastatin 10 MG Oral Tab Take 1 tablet (10 mg total) by mouth nightly. 90 tablet 3    Multiple Vitamin (ONE-DAILY MULTI VITAMINS) Oral Tab Take 1 tablet by mouth daily.       Were there any changes to your current medication(s) noted on the AVS? Yes  The patient is aware to avoid NSAIDS and monitor for signs of bleeding.   NCM reviewed the following changes with the patient:     START taking:  apixaban (Eliquis)  metoprolol succinate ER (Toprol XL)  Start taking on: February 28, 2024  If so, were these medication changes discussed with you prior to leaving the hospital? Yes  If a new medication was prescribed:    Was the new medication's purpose & side effects reviewed? Yes  Do you have any questions about your new medication? No  Did you  your discharge medications when you left the hospital? Yes  Let's go over your medications together to make sure we are not missing anything. Medications Reviewed  Are there any reasons that keep you from taking your medication as prescribed? No  Are you having any concerns with constipation? No      Discharge medications reviewed/discussed/and reconciled against outpatient medications with patient.  Any changes or updates to medications sent to PCP.  Patient Acknowledged     Referrals/orders at D/C:  Referrals/orders placed at D/C? no    DME ordered at D/C? No      Discharge orders, AVS reviewed and discussed with patient. Any changes or updates to orders sent to PCP.  Patient Acknowledged      SDOH:   Transportation Needs: No Transportation Needs (2/26/2024)    Transportation Needs     Lack of Transportation: No     Financial Resource Strain: Low Risk  (2/28/2024)    Financial Resource Strain     Difficulty of Paying Living Expenses: Not hard at all     Med Affordability: No       Diagnosis specifics:     Home care  Take your medicines exactly as directed. Don’t skip  doses.  Work with your healthcare provider to find the right medicines and doses for you.  Learn to take your own pulse. Keep a record of your results. Ask your provider which pulse rates mean that you  need medical attention. Slowing your pulse is often the goal of treatment. Ask your provider if it’s OK for you to  use an automatic machine to check your pulse at home. Sometimes these machines don’t count the pulse  correctly with AFib.  Limit how much coffee, tea, cola, and other drinks with caffeine you have. Ask your provider if you should cut out  all caffeine.  Don't take over-the-counter medicines that have caffeine in them. Also don't take medicines with  pseudoephedrine.  Let your provider know what medicines you take. These include prescription and over-the-counter medicines, as  well as any supplements. They interfere with some medicines given for AFib.  Ask your provider if you can drink alcohol. Some people need to cut out alcohol to better treat AFib. If you are  taking blood-thinner medicines, alcohol may interfere with them by increasing their effect.  Never take stimulants such as amphetamines or cocaine. These drugs can speed up your heart rate and trigger  AFib.  Manage your weight. If you are above your ideal body weight, losing excess pounds can reduce your incidence of  AFib.  Follow-up care  Follow up with your healthcare provider as advised.  When to call your healthcare provider  Call your healthcare provider right away if you have any of the following:  Weakness  Dizziness  Fainting  Tiredness (fatigue)  Shortness of breath  Chest pain with increased activity  A change in the usual regularity of your heartbeat, or an unusually fast heartbeat    Follow up appointments:      Your appointments       Date & Time Appointment Department (Center)    Mar 04, 2024 11:30 AM [facility] Home Sleep Apnea with [facility] SLEEP ROOMS [facility] ([facility] [place])               [facility]  [facility]  [address] [place] [address]  [phone]            TCC  Was TCC ordered: No    PCP (If no TCC appointment)  Does patient already have a PCP appointment scheduled? No  NCM Attempted to schedule PCP office TCM appointment with patient   If no appointment scheduled: Explain    The patient declined at this time. The patient stated since he previously was seeing Dr. Galaviz- he is looking to establish care with a new PCP. NCM offered assistance with scheduling a new patient appointment and pt declined stating he prefers to research doctors on his own and find one. NCM did provide names of [facility] providers whom are accepting new patients including Dr. Bass, Dr. Newsome, Dr. Jeffery. Pt states he will go online and do research and call to schedule an appointment when he is ready.  A TE has been sent to the PCP office as an FYI     Specialist    Does the patient have any other follow up appointment(s) needing to be scheduled? No  If yes: NCM reviewed upcoming specialist appointment with patient: Yes- Pt reports he is scheduled with Dr. Quinn for next Thursday 03/07/2024.   Schedule an appointment with  Mar Quinn MD as soon as  possible for a visit in 1 week(s)  Specialty: Cardiac Electrophysiology,  CARDIOLOGY  [facility]  [address]  [address] [facility] [address]  [phone]    Does the patient need assistance scheduling appointment(s): No    Is there any reason as to why you cannot make your appointment(s)?  No     Needs post D/C:   Now that you are home, are there any needs or concerns you need addressed before your next visit with your PCP?  (DME, meds, questions, etc.): No    Interventions by NCM:   NCM provided education to the patient with a focus on AFIB. NCM advised cardiac diet and provided education on blood thinner use. Reviewed s/s of bleeding. NCM encouraged patient to continue to keep track of HR daily and compliance with  his medications. NC encouraged patient to reach out to his Cardiologist if further questions regarding AFIB and he verbalized agreement with this. Patient declined TCM appt- patient is looking to establish care with a new PCP--TE sent to PCP office as an FYI/per TCM protocol. All d/c instructions reviewed with pt.  Reviewed when to call MD vs when to go to ER/call 911.  Educated pt on the importance of taking all meds as prescribed as well as close f/u with PCP/specialists.  Pt verbalized understanding and will contact office with any further questions or concerns.       Overall Rating:   How would you rate the care you received while in the hospital? excellent    CCM referral placed:    Not Applicable

## 2024-02-29 NOTE — PAYOR COMM NOTE
--------------  DISCHARGE REVIEW    Payor: [employer] PPO  Subscriber #:  [id number]  Authorization Number: [id number]    Admit date: 24  Admit time:  11:03 AM  Discharge Date: 2024  6:37 PM         [facility]    Discharge Summary    Bart Faustin Patient Status:  Inpatient    1965 MRN [id number]   Location [facility] 3W/SW Attending Rusty Beal MD   Hosp Day # 1 PCP GERMAN PONCE MD     Date of Admission: 2024     Date of Discharge: 24      Lace+ Score: 36  59-90 High Risk  29-58 Medium Risk  0-28   Low Risk.    TCM Follow-Up Recommendation:  LACE 29-58: Moderate Risk of readmission after discharge from the hospital.    DISCHARGE DX: Principal Problem:    Atrial fibrillation with rapid ventricular response (HCC)       The patient was seen and examined on day of discharge and this discharge summary is in conjunction with any daily progress note from day of discharge.    HPI per admitting physician: \"This is a 58 year oldmale who presented complaining of shortness of breath and chest pain.  Patient stated symptoms started suddenly in the morning of admission.  Patient stated episodes were brief, occurred mostly with movements.  Shortness of breath seem to resolve with rest.  Patient denied feelings of palpitation, diaphoresis, nausea or vomiting.  Patient denies previous heart conditions.  Patient denies history of smoking.  At time of interview, patient states symptoms were much improved. \"    Hospital Course:       Atrial fibrillation with rapid ventricular response   -New Onset  -Patient presenting with shortness of breath and chest pain  -EKG in the ED noting atrial fibrillation with rapid ventricular response.  -Started on IV Cardizem with improvement of rates.  -Converted to SR   -Started on Eliquis for AC  -Metoprolol for rate control  -Cardiology consulted, rec sleep study as outpt and follow up in clinic     Hyperglycemia  -Previous  A1c noted to be 6.2  -Patient counseled on lifestyle modifications  -Continue to monitor and add agents as needed     Hyperlipidemia  -Continue statin      Physical Exam:    Vitals:    02/27/24 0319 02/27/24 0400 02/27/24 0700 02/27/24 0956   BP:    (!) 124/91   BP Location:    Right arm   Pulse:  82 88 83   Resp:    20   Temp:    97.8 °F (36.6 °C)   TempSrc:    Oral   SpO2:    95%   Weight: 265 lb 6.4 oz (120.4 kg)      Height:         Patient Weight for the past 72 hrs:   Weight   02/26/24 0811 270 lb (122.5 kg)   02/26/24 0921 274 lb 11.1 oz (124.6 kg)   02/27/24 0319 265 lb 6.4 oz (120.4 kg)       Intake/Output Summary (Last 24 hours) at 2/27/2024 1331  Last data filed at 2/27/2024 1116  Gross per 24 hour   Intake 800 ml   Output --   Net 800 ml         GENERAL:  Awake and alert, in no acute distress.  HEART:  S1 and S2 heard.  RRR   LUNGS:  Air entry was good.  No crackles or wheezes   ABDOMEN: Soft and non-tender.    PSYCHIATRIC: Normal mood    CULTURE:   No results found for this visit on 02/26/24.    IMAGING STUDIES: SOME MAY NEED FOLLOW UP WITH PCP   CARD NUC STRESS TEST LEXISCAN (LXX=[id number]/)    Result Date: 2/27/2024  CONCLUSION:  1. Normal myocardial perfusion study. 2. Normal ECG response to Lexiscan. 3. Calculated LVEF 65%.     Dictated by (CST): Neel Cordova MD on 2/27/2024 at 10:16 AM     Finalized by (CST): Neel Cordova MD on 2/27/2024 at 10:18 AM          XR CHEST AP PORTABLE  (CPT=71045)    Result Date: 2/26/2024  CONCLUSION: No acute cardiopulmonary abnormality.    Dictated by (CST): Edu Bloom MD on 2/26/2024 at 9:48 AM     Finalized by (CST): Edu Bloom MD on 2/26/2024 at 9:50 AM           LABS :     Lab Results   Component Value Date    WBC 7.4 02/27/2024    HGB 16.4 02/27/2024    HCT 47.5 02/27/2024    .0 02/27/2024    .0 02/27/2024    CREATSERUM 1.10 02/27/2024    BUN 16 02/27/2024     02/27/2024    K 4.1 02/27/2024    K 4.1 02/27/2024      02/27/2024    CO2 27.0 02/27/2024     (H) 02/27/2024    CA 9.7 02/27/2024    ALB 4.0 05/31/2023    ALKPHO 65 05/31/2023    BILT 0.5 05/31/2023    TP 7.4 05/31/2023    AST 23 05/31/2023    ALT 36 05/31/2023    PTT 42.5 (H) 02/27/2024    TSH 3.490 04/18/2023    PSA 8.92 (H) 07/08/2022    MG 2.3 02/27/2024       Recent Labs   Lab 02/26/24  0853 02/26/24  1801 02/27/24  0953   RBC 4.93 5.30 5.27   HGB 14.7 16.0 16.4   HCT 44.7 46.7 47.5   MCV 90.7 88.1 90.1   MCH 29.8 30.2 31.1   MCHC 32.9 34.3 34.5   RDW 11.9 12.1 12.1   NEPRELIM 2.68  --  4.60   WBC 4.5 6.9 7.4   .0 236.0 240.0  240.0     Recent Labs   Lab 02/26/24  0853 02/26/24  1801 02/27/24  0953   * 104* 118*   BUN 19 13 16   CREATSERUM 1.10 0.93 1.10   CA 9.4 9.5 9.7    140 138   K 4.0 3.8 4.1  4.1    107 106   CO2 26.0 26.0 27.0     No results found for: \"PT\", \"INR\"    Disposition: Discharge to Home    Condition at Discharge: Stable     Discharge Medications:      Discharge Medications        START taking these medications        Instructions Prescription details   apixaban 5 MG Tabs  Commonly known as: Eliquis      Take 1 tablet (5 mg total) by mouth 2 (two) times daily.   Quantity: 60 tablet  Refills: 0     metoprolol succinate ER 25 MG Tb24  Commonly known as: Toprol XL  Start taking on: February 28, 2024      Take 0.5 tablets (12.5 mg total) by mouth Daily Beta Blocker.   Quantity: 30 tablet  Refills: 0            CONTINUE taking these medications        Instructions Prescription details   atorvastatin 10 MG Tabs  Commonly known as: Lipitor      Take 1 tablet (10 mg total) by mouth nightly.   Quantity: 90 tablet  Refills: 3     One-Daily Multi Vitamins Tabs      Take 1 tablet by mouth daily.   Refills: 0               Where to Get Your Medications        These medications [id number] [place] [facility] [address] [phone], [phone]  [address] [place] [address]      Phone: [phone]    metoprolol succinate ER 25 MG Tb24       Please  your prescriptions at the location directed by your doctor or nurse    Bring a paper prescription for each of these medications  apixaban 5 MG Tabs         Follow up Visits  No follow-up provider specified.  GERMAN PONCE MD    Consultants         Provider   Role Specialty     Rocky Hermosillo MD  Consulting Physician Interventional, Cardiology              Other Discharge Instructions:       ----------------------------------------------------  35 MIN SPENT ON THIS DC   Rusty Beal MD    2/27/2024      Electronically signed by Rusty Beal MD on 2/27/2024 10:21 PM         REVIEWER COMMENTS

## 2024-02-29 NOTE — PAYOR COMM NOTE
--------------  ADMISSION REVIEW     Payor: [employer] PPO  Subscriber #:  [id number]  Authorization Number: [id number]    Admit date: 2/26/24  Admit time: 11:03 AM       Patient Seen in: [facility] Emergency Department    History   Stated Complaint: chest pain    59 y/o male healthy otherwise here for eval of palpitations of CP/SOB this am.  No recent travel.  Fine throughtout the weekend.  No cardiac hx.  No smoking.  No fever and no other assoc symptoms.    Objective:   Past Medical History:   Diagnosis Date    Diabetes mellitus type 2 in obese (HCC)     Essential tremor     Hx of colonoscopy     hyperplastic polyp x 2---12/17    Obstructive apnea 06/28/2017    AHI 56 SaO2 nadair 78 % CPAP 12 [facility]     Past Surgical History:   Procedure Laterality Date    OTHER      lasik right     Physical Exam     ED Triage Vitals [02/26/24 0811]   /83   Pulse 91   Resp 22   Temp 98 °F (36.7 °C)   Temp src Temporal   SpO2 96 %   O2 Device None (Room air)     Current:/80   Pulse (!) 143   Temp 98 °F (36.7 °C) (Temporal)   Resp 18   Ht 193 cm (6' 4\")   Wt 124.6 kg   SpO2 99%   BMI 33.44 kg/m²     Physical Exam    Constitutional: Oriented to person, place, and time.  Appears well-developed. No distress.   Head: Normocephalic and atraumatic.   Eyes: Conjunctivae are normal. Pupils are equal, round, and reactive to light.   Neck: Normal range of motion. Neck supple. No noted JVD  Cardiovascular: Tachy, irregular rhythm and intact and equal distal pulses.  No obvious murmur  Pulmonary/Chest: Effort normal. No respiratory distress. NO sig wheeze/rales.  Abdominal: Soft. There is no tenderness. There is no guarding.   Musculoskeletal: Normal range of motion. No edema or tenderness.   Neurological: Alert and oriented to person, place, and time. No gross focal deficits.  Skin: Skin is warm and dry.     Labs Reviewed   BASIC METABOLIC PANEL (8) - Abnormal; Notable for the following components:        Result Value    Glucose 149 (*)     All other components within normal limits   TROPONIN I HIGH SENSITIVITY - Normal   BNP (B TYPE NATRIURETIC PEPTIDE) - Normal   MAGNESIUM - Normal   CBC WITH DIFFERENTIAL WITH PLATELET       Rapid A-fib, rate related changes, no obvious acute ischemic changes    XR CHEST AP PORTABLE   No acute cardiopulmonary abnormality.       Medical Decision Making  Patient given a dose of Cardizem here with some improvement.  Still little tacky so we will start on Cardizem.  I messaged and spoke with the [facility] NP.  Recommended Cardizem but no heparin for now.  They will evaluate his risk factors first.  The hospitalist will be admitting.  He is stable.  Discussed with wife and him.  Likely will get an echo today    Problems Addressed:  Atrial fibrillation with rapid ventricular response (HCC): acute illness or injury with systemic symptoms that poses a threat to life or bodily functions    Amount and/or Complexity of Data Reviewed  External Data Reviewed: ECG and notes.     Details: No prior EKGs or prior cardiac consults or echocardiograms documented in the chart on review  Labs: ordered. Decision-making details documented in ED Course.  Radiology: ordered and independent interpretation performed. Decision-making details documented in ED Course.     Details: By my review there is no obvious evidence of pulmonary edema, pleural effusion, pneumothorax or focal infiltrate on x-ray imaging.  ECG/medicine tests: ordered and independent interpretation performed. Decision-making details documented in ED Course.    Disposition and Plan     Clinical Impression:  1. Atrial fibrillation with rapid ventricular response (HCC)             History and Physical      This is a 58 year oldmale who presented complaining of shortness of breath and chest pain.  Patient stated symptoms started suddenly in the morning of admission.  Patient stated episodes were brief, occurred mostly with movements.  Shortness  of breath seem to resolve with rest.  Patient denied feelings of palpitation, diaphoresis, nausea or vomiting.  Patient denies previous heart conditions.  Patient denies history of smoking.  At time of interview, patient states symptoms were much improved.       ASSESSMENT/PLAN       Atrial fibrillation with rapid ventricular response   -New Onset  -Patient presenting with shortness of breath and chest pain  -EKG in the ED noting atrial fibrillation with rapid ventricular response.  -Started on IV Cardizem with improvement of rates.  -Telemetry monitoring.   -Initial troponin negative, continue trending   -Cardiology consulted, appreciate recs.   -Consider further ischemic evaluation.      Hyperglycemia  -Previous A1c noted to be 6.2  -Patient counseled on lifestyle modifications  -Continue to monitor and add agents as needed     Hyperlipidemia  -Continue statin      CARDS:      Assessment and Recommendations:      Atrial fibrillation and atrial flutter  -New diagnosis  -Insidious onset of symptoms, unclear on AF duration prior to presentation  -Rapid rates, especially when in flutter (2:1 conduction)  -We reviewed AF and AFL, risk factors, natural course, sequelae; treatment goals including symptoms control, stroke prevention, and heart failure prevention; and treatment options with both rate control and rhythm control strategies.  -He will complete an echo and nuclear stress test tomorrow.  -For stroke prevention, he will start anticoagulation.  For now, use IV heparin until testing completed and no indication for invasive procedures.  Will start a DOAC after that.  Goals, alts, risks of anticoag reviewed with him.  -For the AF, if it does not spontaneously convert by Wednesday, will plan a JANA and DCCV then.  Goals, alts, risks of this reviewed in detail and all questions answered.  -We also discussed the need to address AF risk factors, including overweight, severe SIM, possible new dx of hypertension.  -We will  apply CPAP empirically tonight, and he will complete a sleep eval as an outpt.    History of present illness:   The patient is a 58 year old who came to the ED due to shortness of breath and chest pain. He had the insidious onset of this but acute exacerbation this morning, worsening with activity and exertion.  He has brief lightheadedness, as well.  No syncope.  No bleeding problems.       Medications:       atorvastatin  10 mg Oral Nightly    multivitamin  1 tablet Oral Daily    heparin  5,000 Units Subcutaneous Q8H NICO       dilTIAZem 10 mg/hr (02/26/24 1614)         PHYSICAL EXAM:   Blood pressure (!) 140/98, pulse (!) 142, temperature 98.4 °F (36.9 °C), temperature source Oral, resp. rate 20, height 6' 4\" (1.93 m), weight 274 lb 11.1 oz (124.6 kg), SpO2 94%.  GEN - no distress  HEENT - normal conjunctiva, moist mucosa  Neck - supple, no LAD  Lungs - nonlabored, clear bilaterally  Heart - JVP 14 cm H2O, carotids normal; reg, tachy, nl S1/S2, no murmur, gallop, or rub; no edema  Abd - soft, nontender  Ext - arthritic changes  Neuro - A+Ox3, no facial droop or gross deficits  Psych - cooperative, calm     LABS AND DATA:      ECG: atrial fib, 149 bpm, incomp RBBB, nospec ST abnormality         NURSING:    Per cardiology, plan for stress test tomorrow. Heparin gtt started.       2/27:      CARDIOLOGIST ADDENDUM:     I agree with the findings above.  I have personally performed the Assessment and Plan in its entirety, as detailed below:     Paroxysmal AF  -converted to SR this am     HTN  -BP elevated while here, new diagnosis     HLD  -     SIM  -scheduled for sleep study as outpatient in next few weeks     Plan:  -Start eliquis 5 BID  -Start metoprolol succinate 12.5mg daily  -Sleep study as outpatient  -See me in 2 weeks       Vitals (last day) before discharge       Date/Time Temp Pulse Resp BP SpO2 Weight O2 Device O2 Flow Rate (L/min) Who    02/27/24 0956 97.8 °F (36.6 °C) 83 20 124/91 95 % -- None  (Room air) --     02/27/24 0700 -- 88 -- -- -- -- -- --     02/27/24 0400 -- 82 -- -- -- -- -- -- GT    02/27/24 0319 -- -- -- -- -- 265 lb 6.4 oz -- -- TB    02/27/24 0258 98 °F (36.7 °C) 70 18 106/73 94 % -- None (Room air) -- RN    02/27/24 0105 -- 73 -- -- -- -- -- -- RN    02/27/24 0057 -- 83 -- -- -- -- -- -- GT    02/27/24 0050 -- 77 -- -- -- -- -- -- RN    02/27/24 0000 98.3 °F (36.8 °C) 111 20 125/93 97 % -- None (Room air) -- RN    02/26/24 2343 -- 123 -- -- -- -- -- -- RN    02/26/24 2218 -- 146 -- 124/89 -- -- -- -- RN    02/26/24 2030 98.3 °F (36.8 °C) 146 20 129/91 94 % -- None (Room air) -- RN    02/26/24 1906 -- 144 -- -- -- -- -- --     02/26/24 1741 98.4 °F (36.9 °C) 142 20 140/98 94 % -- None (Room air) --     02/26/24 1629 -- 140 -- -- -- -- -- --     02/26/24 1617 -- -- -- 132/107 -- -- -- --     02/26/24 1542 -- 140 -- -- -- -- -- --     02/26/24 1527 -- -- -- 127/100 -- -- -- --     02/26/24 1459 -- 140 20 128/102 94 % -- None (Room air) --     02/26/24 1436 -- 140 -- -- -- -- -- --     02/26/24 1256 -- 100 20 125/88 94 % -- None (Room air) --     02/26/24 1101 -- 111 -- -- -- -- -- --     02/26/24 1100 98 °F (36.7 °C) 120 16 127/95 96 % -- None (Room air) --     02/26/24 1015 -- 108 20 104/85 94 % -- None (Room air) -- SK    02/26/24 0948 -- 143 18 115/80 99 % -- None (Room air) -- SK

## 2024-03-04 ENCOUNTER — OFFICE VISIT (OUTPATIENT)
Dept: SLEEP CENTER | Age: 59
End: 2024-03-04
Attending: INTERNAL MEDICINE
Payer: COMMERCIAL

## 2024-03-04 DIAGNOSIS — G47.33 OBSTRUCTIVE APNEA: ICD-10-CM

## 2024-03-04 PROCEDURE — 95806 SLEEP STUDY UNATT&RESP EFFT: CPT

## 2024-03-07 ENCOUNTER — ORDER TRANSCRIPTION (OUTPATIENT)
Dept: SLEEP CENTER | Age: 59
End: 2024-03-07

## 2024-03-07 DIAGNOSIS — G47.33 OSA (OBSTRUCTIVE SLEEP APNEA): Primary | ICD-10-CM

## 2024-03-14 ENCOUNTER — OFFICE VISIT (OUTPATIENT)
Dept: INTERNAL MEDICINE CLINIC | Facility: CLINIC | Age: 59
End: 2024-03-14
Payer: COMMERCIAL

## 2024-03-14 VITALS
HEART RATE: 67 BPM | SYSTOLIC BLOOD PRESSURE: 120 MMHG | HEIGHT: 76 IN | BODY MASS INDEX: 32.88 KG/M2 | OXYGEN SATURATION: 96 % | WEIGHT: 270 LBS | DIASTOLIC BLOOD PRESSURE: 76 MMHG

## 2024-03-14 DIAGNOSIS — E66.9 TYPE 2 DIABETES MELLITUS WITH OBESITY (HCC): ICD-10-CM

## 2024-03-14 DIAGNOSIS — I48.91 ATRIAL FIBRILLATION WITH RAPID VENTRICULAR RESPONSE (HCC): Primary | ICD-10-CM

## 2024-03-14 DIAGNOSIS — M25.511 RIGHT SHOULDER PAIN, UNSPECIFIED CHRONICITY: ICD-10-CM

## 2024-03-14 DIAGNOSIS — G25.0 ESSENTIAL TREMOR: ICD-10-CM

## 2024-03-14 DIAGNOSIS — G47.33 OBSTRUCTIVE APNEA: ICD-10-CM

## 2024-03-14 DIAGNOSIS — R97.20 ELEVATED PSA: ICD-10-CM

## 2024-03-14 DIAGNOSIS — E11.69 TYPE 2 DIABETES MELLITUS WITH OBESITY (HCC): ICD-10-CM

## 2024-03-14 PROBLEM — M54.12 CERVICAL RADICULOPATHY: Status: RESOLVED | Noted: 2017-07-20 | Resolved: 2024-03-14

## 2024-03-14 PROCEDURE — 3008F BODY MASS INDEX DOCD: CPT | Performed by: INTERNAL MEDICINE

## 2024-03-14 PROCEDURE — 3074F SYST BP LT 130 MM HG: CPT | Performed by: INTERNAL MEDICINE

## 2024-03-14 PROCEDURE — 3078F DIAST BP <80 MM HG: CPT | Performed by: INTERNAL MEDICINE

## 2024-03-14 PROCEDURE — 99204 OFFICE O/P NEW MOD 45 MIN: CPT | Performed by: INTERNAL MEDICINE

## 2024-03-14 RX ORDER — LOSARTAN POTASSIUM 50 MG/1
50 TABLET ORAL DAILY
COMMUNITY
Start: 2024-03-07

## 2024-03-14 NOTE — PROGRESS NOTES
Bart Faustin male 58 year old         Chief Complaint   Patient presents with    [facility] F/U    Shoulder Pain     Right shoulder, had Cortizone shot 6 months ago.      Recent  afib with RVR - converted with  cardizem drip     Had  CP  - nl stress test    Discussed DM - diet controlled  didn't know had it     6.5 in 2020    Elevated BP in er  now on meds      Saw Dr Quinn - has monitor on         Right  better  after  steroid shot and now  hurts again        Patient Active Problem List   Diagnosis    Essential tremor    Elevated PSA    Obstructive apnea    Type 2 diabetes mellitus with obesity (HCC)    Atrial fibrillation with rapid ventricular response (HCC)          Current Outpatient Medications on File Prior to Visit   Medication Sig Dispense Refill    losartan 50 MG Oral Tab Take 1 tablet (50 mg total) by mouth daily.      apixaban 5 MG Oral Tab Take 1 tablet (5 mg total) by mouth 2 (two) times daily. 60 tablet 0    metoprolol succinate ER 25 MG Oral Tablet 24 Hr Take 0.5 tablets (12.5 mg total) by mouth Daily Beta Blocker. 30 tablet 0    atorvastatin 10 MG Oral Tab Take 1 tablet (10 mg total) by mouth nightly. 90 tablet 3    Multiple Vitamin (ONE-DAILY MULTI VITAMINS) Oral Tab Take 1 tablet by mouth daily.       No current facility-administered medications on file prior to visit.          Vitals:    03/14/24 1538   BP: 120/76   Pulse: 67   VITALSBody mass index is 32.87 kg/m².    Pertinent findings on the physical exam; cor reg  chest clear      Bart was seen today for [facility] f/u and shoulder pain.    Diagnoses and all orders for this visit:    Atrial fibrillation with rapid ventricular response (HCC)    Essential tremor    Type 2 diabetes mellitus with obesity (HCC)    Obstructive apnea    Elevated PSA    Right shoulder pain, unspecified chronicity       Now in sinus on current meds  discussed need for anticoagulation - and meds     Sleep study pending     BP   at goal     Had nl stress     Discussed that he does have  DM  based on A1c 6.5 in past  -  diet controlled      Discussed goals of care - need to see optho  check labs - low carb diet wt loss     His tremor not bad today      Shoulder pain persist see ortho           This note was prepared using Dragon Medical voice recognition dictation software and as a result, errors may occur. When identified, these errors have been corrected. While every attempt is made to correct errors during dictation, discrepancies may still exist

## 2024-03-22 ENCOUNTER — LAB ENCOUNTER (OUTPATIENT)
Dept: LAB | Facility: HOSPITAL | Age: 59
End: 2024-03-22
Attending: INTERNAL MEDICINE
Payer: COMMERCIAL

## 2024-03-22 DIAGNOSIS — G25.0 ESSENTIAL TREMOR: ICD-10-CM

## 2024-03-22 DIAGNOSIS — E11.69 TYPE 2 DIABETES MELLITUS WITH OBESITY (HCC): ICD-10-CM

## 2024-03-22 DIAGNOSIS — E66.9 TYPE 2 DIABETES MELLITUS WITH OBESITY (HCC): ICD-10-CM

## 2024-03-22 LAB
ALBUMIN SERPL-MCNC: 4.6 G/DL (ref 3.2–4.8)
ALBUMIN/GLOB SERPL: 1.7 {RATIO} (ref 1–2)
ALP LIVER SERPL-CCNC: 69 U/L
ALT SERPL-CCNC: 21 U/L
ANION GAP SERPL CALC-SCNC: 4 MMOL/L (ref 0–18)
AST SERPL-CCNC: 20 U/L (ref ?–34)
BILIRUB SERPL-MCNC: 0.8 MG/DL (ref 0.3–1.2)
BUN BLD-MCNC: 14 MG/DL (ref 9–23)
BUN/CREAT SERPL: 13.1 (ref 10–20)
CALCIUM BLD-MCNC: 10.1 MG/DL (ref 8.7–10.4)
CHLORIDE SERPL-SCNC: 105 MMOL/L (ref 98–112)
CHOLEST SERPL-MCNC: 145 MG/DL (ref ?–200)
CO2 SERPL-SCNC: 29 MMOL/L (ref 21–32)
CREAT BLD-MCNC: 1.07 MG/DL
CREAT UR-SCNC: 121.8 MG/DL
EGFRCR SERPLBLD CKD-EPI 2021: 80 ML/MIN/1.73M2 (ref 60–?)
EST. AVERAGE GLUCOSE BLD GHB EST-MCNC: 131 MG/DL (ref 68–126)
FASTING PATIENT LIPID ANSWER: YES
FASTING STATUS PATIENT QL REPORTED: YES
GLOBULIN PLAS-MCNC: 2.7 G/DL (ref 2.8–4.4)
GLUCOSE BLD-MCNC: 114 MG/DL (ref 70–99)
HBA1C MFR BLD: 6.2 % (ref ?–5.7)
HDLC SERPL-MCNC: 45 MG/DL (ref 40–59)
LDLC SERPL CALC-MCNC: 81 MG/DL (ref ?–100)
MICROALBUMIN UR-MCNC: <0.3 MG/DL
NONHDLC SERPL-MCNC: 100 MG/DL (ref ?–130)
OSMOLALITY SERPL CALC.SUM OF ELEC: 287 MOSM/KG (ref 275–295)
POTASSIUM SERPL-SCNC: 4.3 MMOL/L (ref 3.5–5.1)
PROT SERPL-MCNC: 7.3 G/DL (ref 5.7–8.2)
SODIUM SERPL-SCNC: 138 MMOL/L (ref 136–145)
TRIGL SERPL-MCNC: 103 MG/DL (ref 30–149)
TSI SER-ACNC: 3 MIU/ML (ref 0.55–4.78)
VLDLC SERPL CALC-MCNC: 16 MG/DL (ref 0–30)

## 2024-03-22 PROCEDURE — 84443 ASSAY THYROID STIM HORMONE: CPT | Performed by: INTERNAL MEDICINE

## 2024-03-22 PROCEDURE — 80053 COMPREHEN METABOLIC PANEL: CPT | Performed by: INTERNAL MEDICINE

## 2024-03-22 PROCEDURE — 82570 ASSAY OF URINE CREATININE: CPT | Performed by: INTERNAL MEDICINE

## 2024-03-22 PROCEDURE — 83036 HEMOGLOBIN GLYCOSYLATED A1C: CPT | Performed by: INTERNAL MEDICINE

## 2024-03-22 PROCEDURE — 82043 UR ALBUMIN QUANTITATIVE: CPT | Performed by: INTERNAL MEDICINE

## 2024-03-22 PROCEDURE — 80061 LIPID PANEL: CPT | Performed by: INTERNAL MEDICINE

## 2024-03-23 ENCOUNTER — PATIENT MESSAGE (OUTPATIENT)
Dept: INTERNAL MEDICINE CLINIC | Facility: CLINIC | Age: 59
End: 2024-03-23

## 2024-03-23 DIAGNOSIS — I48.91 ATRIAL FIBRILLATION WITH RAPID VENTRICULAR RESPONSE (HCC): Primary | ICD-10-CM

## 2024-03-25 NOTE — TELEPHONE ENCOUNTER
S/w Bart who stated he has been evaluated and receiving care from [facility] [place] and wearing a heart monitor. Pt directed to call [facility] to request refill for his apixaban (Eliquis) tab 5 mg, Pt informed if he cannot get this refill to contact our office and RN will request PCP to give Rx to bridge until he can receive this medication. Pt voiced understanding.

## 2024-03-25 NOTE — TELEPHONE ENCOUNTER
From: Bart Faustin  To: Jignesh Bass  Sent: 3/23/2024 2:22 PM CDT  Subject: Eliquis refill    my supply of Eliquis will run out this week and I will be out of town, am I supposed to get a refill or wait until after the heart rate test? Thx

## 2024-03-25 NOTE — TELEPHONE ENCOUNTER
LM to call the office.     Inquire if follow-up with cardiologist at [facility]. Pt requesting prescription for apixaban (Eliquis) tab 5 mg.

## 2024-04-06 ENCOUNTER — OFFICE VISIT (OUTPATIENT)
Dept: SLEEP CENTER | Age: 59
End: 2024-04-06
Attending: INTERNAL MEDICINE
Payer: COMMERCIAL

## 2024-04-06 DIAGNOSIS — G47.33 OSA (OBSTRUCTIVE SLEEP APNEA): ICD-10-CM

## 2024-04-06 PROCEDURE — 95811 POLYSOM 6/>YRS CPAP 4/> PARM: CPT

## 2024-04-25 ENCOUNTER — OFFICE VISIT (OUTPATIENT)
Dept: INTERNAL MEDICINE CLINIC | Facility: CLINIC | Age: 59
End: 2024-04-25
Payer: COMMERCIAL

## 2024-04-25 VITALS
DIASTOLIC BLOOD PRESSURE: 62 MMHG | BODY MASS INDEX: 33.12 KG/M2 | WEIGHT: 272 LBS | HEIGHT: 76 IN | OXYGEN SATURATION: 96 % | SYSTOLIC BLOOD PRESSURE: 118 MMHG | HEART RATE: 74 BPM

## 2024-04-25 DIAGNOSIS — E11.69 TYPE 2 DIABETES MELLITUS WITH OBESITY (HCC): ICD-10-CM

## 2024-04-25 DIAGNOSIS — Z12.5 PROSTATE CANCER SCREENING: ICD-10-CM

## 2024-04-25 DIAGNOSIS — E66.9 TYPE 2 DIABETES MELLITUS WITH OBESITY (HCC): ICD-10-CM

## 2024-04-25 DIAGNOSIS — I48.91 ATRIAL FIBRILLATION WITH RAPID VENTRICULAR RESPONSE (HCC): Primary | ICD-10-CM

## 2024-04-25 DIAGNOSIS — G47.33 OBSTRUCTIVE APNEA: ICD-10-CM

## 2024-04-25 DIAGNOSIS — M25.511 RIGHT SHOULDER PAIN, UNSPECIFIED CHRONICITY: ICD-10-CM

## 2024-04-25 DIAGNOSIS — G89.11 ACUTE SHOULDER PAIN DUE TO TRAUMA, RIGHT: ICD-10-CM

## 2024-04-25 DIAGNOSIS — M25.511 ACUTE SHOULDER PAIN DUE TO TRAUMA, RIGHT: ICD-10-CM

## 2024-04-25 PROCEDURE — 3008F BODY MASS INDEX DOCD: CPT | Performed by: INTERNAL MEDICINE

## 2024-04-25 PROCEDURE — 99214 OFFICE O/P EST MOD 30 MIN: CPT | Performed by: INTERNAL MEDICINE

## 2024-04-25 PROCEDURE — 3078F DIAST BP <80 MM HG: CPT | Performed by: INTERNAL MEDICINE

## 2024-04-25 PROCEDURE — 3044F HG A1C LEVEL LT 7.0%: CPT | Performed by: INTERNAL MEDICINE

## 2024-04-25 PROCEDURE — 3061F NEG MICROALBUMINURIA REV: CPT | Performed by: INTERNAL MEDICINE

## 2024-04-25 PROCEDURE — 3074F SYST BP LT 130 MM HG: CPT | Performed by: INTERNAL MEDICINE

## 2024-04-25 NOTE — PROGRESS NOTES
Bart Faustin male 58 year old         Chief Complaint   Patient presents with    Diabetes    Shoulder Pain     Right side     Discussed DM  -  recent  A1c  6.2      Shoulder pain  persist  - shot  helped      Reviewed Dr Quinn note - stopped eliquis   Chadsvasc 1  - has fit  bit  110    once when in [place] and  hot      Had sleep study - pending titration      Bp good     Discusse plaques  - looks like  psoriasis       Had  prostate  biopsy in past  22 -        Patient Active Problem List   Diagnosis    Essential tremor    Elevated PSA    Obstructive apnea    Type 2 diabetes mellitus with obesity (HCC)    Atrial fibrillation with rapid ventricular response (HCC)          Current Outpatient Medications on File Prior to Visit   Medication Sig Dispense Refill    losartan 50 MG Oral Tab Take 1 tablet (50 mg total) by mouth daily.      metoprolol succinate ER 25 MG Oral Tablet 24 Hr Take 0.5 tablets (12.5 mg total) by mouth Daily Beta Blocker. 30 tablet 0    atorvastatin 10 MG Oral Tab Take 1 tablet (10 mg total) by mouth nightly. 90 tablet 3    Multiple Vitamin (ONE-DAILY MULTI VITAMINS) Oral Tab Take 1 tablet by mouth daily.       No current facility-administered medications on file prior to visit.          Vitals:    04/25/24 1515   BP: 118/62   Pulse: 74   VITALSBody mass index is 33.11 kg/m².    Pertinent findings on the physical exam; cor reg  chest  clear      Bilateral barefoote appearance is normal.  Bilateral monofilament/sensation of both feet is normal.  Pedal pulse exam of both lower feet is normal.      Bart was seen today for diabetes and shoulder pain.    Diagnoses and all orders for this visit:    Atrial fibrillation with rapid ventricular response (HCC)    Type 2 diabetes mellitus with obesity (HCC)    Obstructive apnea    Right shoulder pain, unspecified chronicity  -     ORTHOPEDIC - INTERNAL    Prostate cancer screening  -     PSA Total, Screen; Future    Acute shoulder pain due to  trauma, right  -     ORTHOPEDIC - INTERNAL       Diabetes well-controlled continue  diet - - no meds now     Has cpap titration pending     Discussed afib -  low wilfredo no eliquis     Refer to ortho for shoulder       On statin for prevention     BP  good           This note was prepared using Dragon Medical voice recognition dictation software and as a result, errors may occur. When identified, these errors have been corrected. While every attempt is made to correct errors during dictation, discrepancies may still exist

## 2024-05-13 ENCOUNTER — TELEPHONE (OUTPATIENT)
Dept: INTERNAL MEDICINE CLINIC | Facility: CLINIC | Age: 59
End: 2024-05-13

## 2024-05-13 DIAGNOSIS — G47.33 OBSTRUCTIVE APNEA: Primary | ICD-10-CM

## 2024-05-13 NOTE — TELEPHONE ENCOUNTER
Patient called stating that he was supposed to get a CPAP machine from his previous provider who has now retired. Per patient he needs to get the CPAP machine and needs to know how to proceed with this.     He has not been seen by  for an office visit.

## 2024-05-21 NOTE — TELEPHONE ENCOUNTER
Patient, is calling for status of his message. Patient would like a call back if possible today. Patient, can be reached at [phone].

## 2024-05-21 NOTE — TELEPHONE ENCOUNTER
Patient notified via telephone orders faxed and to follow up with [facility].  He verbalized understanding.

## 2024-05-22 ENCOUNTER — LAB ENCOUNTER (OUTPATIENT)
Dept: LAB | Age: 59
End: 2024-05-22
Attending: INTERNAL MEDICINE

## 2024-05-22 DIAGNOSIS — Z12.5 PROSTATE CANCER SCREENING: ICD-10-CM

## 2024-05-22 LAB — COMPLEXED PSA SERPL-MCNC: 2.97 NG/ML (ref ?–4)

## 2024-05-22 PROCEDURE — G0103 PSA SCREENING: HCPCS | Performed by: INTERNAL MEDICINE

## 2024-06-04 RX ORDER — LOSARTAN POTASSIUM 50 MG/1
50 TABLET ORAL DAILY
Qty: 90 TABLET | Refills: 3 | Status: SHIPPED | OUTPATIENT
Start: 2024-06-04

## 2024-06-04 NOTE — TELEPHONE ENCOUNTER
You have not prescribed this before.   Requested Prescriptions     Pending Prescriptions Disp Refills    losartan 50 MG Oral Tab  0     Sig: Take 1 tablet (50 mg total) by mouth daily.     LAST REFILL DATE    QUANTITY REQUESTED    DAY SUPPLY    DIAGNOSIS    LAST OFFICE VISIT  4/25/24   FOLLOW UP DUE      Future Appointments   Date Time Provider Department Center   6/6/2024  3:45 PM Rolando Sarkar MD [facility]   8/26/2024  4:00 PM Jignesh Bass MD [facility]

## 2024-06-13 ENCOUNTER — PATIENT MESSAGE (OUTPATIENT)
Dept: INTERNAL MEDICINE CLINIC | Facility: CLINIC | Age: 59
End: 2024-06-13

## 2024-06-13 RX ORDER — METOPROLOL SUCCINATE 25 MG/1
12.5 TABLET, EXTENDED RELEASE ORAL
Qty: 30 TABLET | Refills: 0 | Status: SHIPPED | OUTPATIENT
Start: 2024-06-13

## 2024-06-13 NOTE — TELEPHONE ENCOUNTER
From: Bart Faustin  To: Jignesh Bass  Sent: 6/13/2024 9:35 AM CDT  Subject: metoprolol succinate ER 25 MG Tb24 refill    Requesting refill of metoprolol succinate ER 25 MG Tb24 at [facility]/pharm[facility] #[id number] - [place], [place] - [address]    Thank you

## 2024-06-28 ENCOUNTER — PATIENT MESSAGE (OUTPATIENT)
Dept: INTERNAL MEDICINE CLINIC | Facility: CLINIC | Age: 59
End: 2024-06-28

## 2024-07-01 RX ORDER — METOPROLOL SUCCINATE 25 MG/1
TABLET, EXTENDED RELEASE ORAL
Qty: 90 TABLET | Refills: 0 | Status: SHIPPED | OUTPATIENT
Start: 2024-07-01

## 2024-08-19 DIAGNOSIS — E78.5 HYPERLIPIDEMIA, UNSPECIFIED HYPERLIPIDEMIA TYPE: ICD-10-CM

## 2024-08-20 NOTE — TELEPHONE ENCOUNTER
You have not prescribed this before, ok to refill?  Requested Prescriptions     Pending Prescriptions Disp Refills    ATORVASTATIN 10 MG Oral Tab [Pharmacy Med Name: Atorvastatin Calcium 10 Mg Tab Nort] 90 tablet 0     Sig: TAKE 1 TABLET (10 MG TOTAL) BY MOUTH NIGHTLY     LAST REFILL DATE    QUANTITY REQUESTED    DAY SUPPLY    DIAGNOSIS    LAST OFFICE VISIT  4/25/24   FOLLOW UP DUE      Future Appointments   Date Time Provider Department Center   8/22/2024  3:45 PM [facility]   8/26/2024  4:00 PM Jignesh Bass MD [facility]

## 2024-08-22 ENCOUNTER — HOSPITAL ENCOUNTER (OUTPATIENT)
Dept: GENERAL RADIOLOGY | Facility: HOSPITAL | Age: 59
Discharge: HOME | End: 2024-08-22
Attending: ORTHOPAEDIC SURGERY
Payer: COMMERCIAL

## 2024-08-22 ENCOUNTER — OFFICE VISIT (OUTPATIENT)
Dept: ORTHOPEDICS CLINIC | Facility: CLINIC | Age: 59
End: 2024-08-22
Payer: COMMERCIAL

## 2024-08-22 DIAGNOSIS — M25.511 RIGHT SHOULDER PAIN, UNSPECIFIED CHRONICITY: ICD-10-CM

## 2024-08-22 DIAGNOSIS — M19.011 ARTHRITIS OF RIGHT ACROMIOCLAVICULAR JOINT: Primary | ICD-10-CM

## 2024-08-22 PROCEDURE — 73030 X-RAY EXAM OF SHOULDER: CPT | Performed by: ORTHOPAEDIC SURGERY

## 2024-08-22 PROCEDURE — 99213 OFFICE O/P EST LOW 20 MIN: CPT | Performed by: ORTHOPAEDIC SURGERY

## 2024-08-22 NOTE — PROGRESS NOTES
NURSING INTAKE COMMENTS:   Chief Complaint   Patient presents with    Shoulder Pain     R shoulder f/u - Was given cortisone injection 06/01/23. Pt states pain onset since February. States pain on and off. Pain  was worse when appointment was initionally made.       HPI: This 58 year old male presents today with complaints of right shoulder pain follow-up.  He was last seen about a year ago.  He had an injection in his AC joint which gave him good relief of symptoms.  He had no significant pain until February when he was hospitalized for an atrial fibrillation episode.  He did not have a fall he feels symptoms may have return due to positioning while in the hospital bed.  He reports minimal discomfort currently.  The pain seems to have tapered down over the last 2 weeks.  He feels discomfort when extending the arm and doing yard work.  He is now wearing his CPAP machine at night so he is no longer sleeping in the prone position.    Past Medical History:    Diabetes mellitus type 2 in obese    Essential tremor    Hx of colonoscopy    hyperplastic polyp x 2---12/17    Obstructive apnea    AHI 56 SaO2 nadair 78 % CPAP 12 [facility]     Past Surgical History:   Procedure Laterality Date    Other      lasik right    Vasectomy  2-27-09    Dr. Palafox     Current Outpatient Medications   Medication Sig Dispense Refill    metoprolol succinate ER 25 MG Oral Tablet 24 Hr Take 1 tablet, 25mg daily. 90 tablet 0    losartan 50 MG Oral Tab Take 1 tablet (50 mg total) by mouth daily. 90 tablet 3    atorvastatin 10 MG Oral Tab Take 1 tablet (10 mg total) by mouth nightly. 90 tablet 3    Multiple Vitamin (ONE-DAILY MULTI VITAMINS) Oral Tab Take 1 tablet by mouth daily.       No Known Allergies  Family History   Problem Relation Age of Onset    Cancer Father         prostate and throat       Social History     Occupational History    Not on file   Tobacco Use    Smoking status: Never     Passive exposure: Never    Smokeless tobacco:  Never   Vaping Use    Vaping status: Never Used   Substance and Sexual Activity    Alcohol use: Yes     Alcohol/week: 0.0 standard drinks of alcohol     Comment: 1-2 drinks/wk    Drug use: No    Sexual activity: Not on file        Review of Systems:  GENERAL: denies fevers, chills, night sweats, fatigue, unintentional weight loss/gain  SKIN: denies skin lesions, open sores, rash  HEENT:denies recent vision change, new nasal congestion,hearing loss, tinnitus, sore throat, headaches  RESPIRATORY: denies new shortness of breath, cough, asthma, wheezing  CARDIOVASCULAR: denies chest pain, leg cramps with exertion, palpitations, leg swelling  GI: denies abdominal pain, nausea, vomiting, diarrhea, constipation, hematochezia, worsening heartburn or stomach ulcers  : denies dysuria, hematuria, incontinence, increased frequency, urgency, difficulty urinating  MUSCULOSKELETAL: denies musculoskeletal complaints other than in HPI  NEURO: denies numbness, tingling, weakness, balance issues, dizziness, memory loss  PSYCHIATRIC: denies Hx of depression, anxiety, other psychiatric disorders  HEMATOLOGIC: denies blood clots, anemia, blood clotting disorders, blood transfusion  ENDOCRINE: denies autoimmune disease, thyroid issues, or diabetes  ALLERGY: denies asthma, seasonal allergies    Physical Examination:    There were no vitals taken for this visit.  Constitutional: appears well hydrated, alert and responsive, no acute distress noted  Extremities: Right shoulder point tender over the AC joint distal clavicle.  Active forward flexion 160 degrees without pain.  Active external rotation 70 degrees without pain.  Passive internal Tatian nearly 80 degrees without pain.  Abduction external rotation motor strength 5 out of 5.  [place] impingement sign negative.  Cross chest adduction testing positive for pain.  Neurological: Unchanged    Imaging:   X-rays right shoulder show moderate degenerative change of the AC joint    Labs:  Lab  Results   Component Value Date    WBC 7.4 02/27/2024    HGB 16.4 02/27/2024    .0 02/27/2024    .0 02/27/2024      Lab Results   Component Value Date     (H) 03/22/2024    BUN 14 03/22/2024    CREATSERUM 1.07 03/22/2024    GFR 74 05/01/2017    GFRNAA 73 04/12/2022    GFRAA 84 04/12/2022        Assessment and Plan:  Diagnoses and all orders for this visit:    Arthritis of right acromioclavicular joint    Right shoulder pain, unspecified chronicity  -     XR SHOULDER, COMPLETE (MIN 2 VIEWS), RIGHT (CPT=73030); Future        Assessment: Right shoulder AC arthritis    Plan: Given his recent improvement in symptoms, I advised continued observation.  Suggested icing consistently at night.  Advised topical Voltaren gel to the area.  Discussed activity modifications and avoidance of repetitive overhead type activities.  Avoid cross chest activities.  Follow-up again as needed.  If symptoms worsen, consider either repeat injection or an MRI to evaluate for potential surgical treatment options.    Follow Up: Return if symptoms worsen or fail to improve.    MAGALYS HOPKINS MD

## 2024-08-25 RX ORDER — ATORVASTATIN CALCIUM 10 MG/1
10 TABLET, FILM COATED ORAL NIGHTLY
Qty: 90 TABLET | Refills: 0 | Status: SHIPPED | OUTPATIENT
Start: 2024-08-25 | End: 2024-08-26

## 2024-08-26 ENCOUNTER — OFFICE VISIT (OUTPATIENT)
Dept: INTERNAL MEDICINE CLINIC | Facility: CLINIC | Age: 59
End: 2024-08-26
Payer: COMMERCIAL

## 2024-08-26 VITALS
HEIGHT: 76 IN | SYSTOLIC BLOOD PRESSURE: 118 MMHG | OXYGEN SATURATION: 95 % | HEART RATE: 64 BPM | BODY MASS INDEX: 33.24 KG/M2 | WEIGHT: 273 LBS | DIASTOLIC BLOOD PRESSURE: 70 MMHG

## 2024-08-26 DIAGNOSIS — E66.9 TYPE 2 DIABETES MELLITUS WITH OBESITY (HCC): Primary | ICD-10-CM

## 2024-08-26 DIAGNOSIS — E78.5 HYPERLIPIDEMIA, UNSPECIFIED HYPERLIPIDEMIA TYPE: ICD-10-CM

## 2024-08-26 DIAGNOSIS — Z23 NEED FOR VACCINATION: ICD-10-CM

## 2024-08-26 DIAGNOSIS — G47.33 OBSTRUCTIVE APNEA: ICD-10-CM

## 2024-08-26 DIAGNOSIS — E11.69 TYPE 2 DIABETES MELLITUS WITH OBESITY (HCC): Primary | ICD-10-CM

## 2024-08-26 PROBLEM — R97.20 ELEVATED PSA: Status: RESOLVED | Noted: 2018-07-25 | Resolved: 2024-08-26

## 2024-08-26 PROCEDURE — 99214 OFFICE O/P EST MOD 30 MIN: CPT | Performed by: INTERNAL MEDICINE

## 2024-08-26 PROCEDURE — 3074F SYST BP LT 130 MM HG: CPT | Performed by: INTERNAL MEDICINE

## 2024-08-26 PROCEDURE — 3008F BODY MASS INDEX DOCD: CPT | Performed by: INTERNAL MEDICINE

## 2024-08-26 PROCEDURE — 90471 IMMUNIZATION ADMIN: CPT | Performed by: INTERNAL MEDICINE

## 2024-08-26 PROCEDURE — 3078F DIAST BP <80 MM HG: CPT | Performed by: INTERNAL MEDICINE

## 2024-08-26 PROCEDURE — 90750 HZV VACC RECOMBINANT IM: CPT | Performed by: INTERNAL MEDICINE

## 2024-08-26 RX ORDER — ATORVASTATIN CALCIUM 10 MG/1
10 TABLET, FILM COATED ORAL NIGHTLY
Qty: 90 TABLET | Refills: 3 | Status: SHIPPED | OUTPATIENT
Start: 2024-08-26

## 2024-08-26 RX ORDER — ATORVASTATIN CALCIUM 10 MG/1
10 TABLET, FILM COATED ORAL NIGHTLY
Qty: 90 TABLET | Refills: 0 | Status: SHIPPED | OUTPATIENT
Start: 2024-08-26 | End: 2024-08-26

## 2024-08-26 NOTE — PROGRESS NOTES
Chief complaint and HPI      Bart Faustin  58 year old male   Chief Complaint   Patient presents with    Atrial Fibrillation     Chief Complaint: DM,HTN, hx of afib , sleep apnea       Discussed diabetes management ; diet  controlled     Last A1c value was 6.2% done 3/22/2024.         Malb/Cre Calc   Date Value Ref Range Status   03/22/2024   Final     Comment:     Unable to calculate due to Urine Microalbumin <0.3 mg/dL       04/18/2023   Final     Comment:     Unable to calculate due to Urine Microalbumin <0.5 mg/dL       Lab Results   Component Value Date     (H) 03/22/2024    BUN 14 03/22/2024    CREATSERUM 1.07 03/22/2024    BUNCREA 13.1 03/22/2024    ANIONGAP 4 03/22/2024    GFRAA 84 04/12/2022    GFRNAA 73 04/12/2022    CA 10.1 03/22/2024     03/22/2024    K 4.3 03/22/2024     03/22/2024    CO2 29.0 03/22/2024    OSMOCALC 287 03/22/2024            Discussed blood pressure readings -  medications , forgets meds  at times    No symptoms     BP Readings from Last 3 Encounters:   08/26/24 118/70   04/25/24 118/62   03/14/24 120/76     Blood Pressure and Cardiac Medications            metoprolol succinate ER 25 MG Oral Tablet 24 Hr    losartan 50 MG Oral Tab             Discussed lipid control for cardiac prevention.no issues    Lab Results   Component Value Date    CHOLEST 145 03/22/2024    TRIG 103 03/22/2024    HDL 45 03/22/2024    LDL 81 03/22/2024    VLDL 16 03/22/2024    TCHDLRATIO 5.00 (H) 05/01/2017    NONHDLC 100 03/22/2024     Cholesterol Lowering Medications            atorvastatin 10 MG Oral Tab              Now has sleep apnea  wearing nasal CPAP     No afib  sx  - has fit bit      Had nl stress   feb  - and  echo      EXAM:     Vitals:    08/26/24 1559   BP: 118/70   Pulse: 64   VITALSBody mass index is 33.23 kg/m².         Neuro ; normal     Neck ; no sig findings    Heart exam; regular     Lungs; no rhonchi or rales     Ext ; no edema     Bart was seen today for atrial  fibrillation.    Diagnoses and all orders for this visit:    Type 2 diabetes mellitus with obesity (HCC)    Hyperlipidemia, unspecified hyperlipidemia type  -     Discontinue: atorvastatin 10 MG Oral Tab; Take 1 tablet (10 mg total) by mouth nightly.  -     atorvastatin 10 MG Oral Tab; Take 1 tablet (10 mg total) by mouth nightly.    Obstructive apnea         DM  control has been good  -  check labs      Discussed  bp  meds and  can take  anytime      Keep on  nasal cpap     Keep on statin                This note was prepared using Dragon Medical voice recognition dictation software and as a result, errors may occur. When identified, these errors have been corrected. While every attempt is made to correct errors during dictation, discrepancies may still exist       Jignesh Bass MD

## 2024-08-29 ENCOUNTER — PATIENT MESSAGE (OUTPATIENT)
Dept: INTERNAL MEDICINE CLINIC | Facility: CLINIC | Age: 59
End: 2024-08-29

## 2024-10-01 ENCOUNTER — PATIENT MESSAGE (OUTPATIENT)
Dept: INTERNAL MEDICINE CLINIC | Facility: CLINIC | Age: 59
End: 2024-10-01

## 2024-10-01 RX ORDER — METOPROLOL SUCCINATE 25 MG/1
TABLET, EXTENDED RELEASE ORAL
Qty: 90 TABLET | Refills: 0 | Status: SHIPPED | OUTPATIENT
Start: 2024-10-01

## 2024-10-01 NOTE — TELEPHONE ENCOUNTER
From: Bart Faustin  To: Jignesh Bass  Sent: 10/1/2024 3:22 PM CDT  Subject: Blood work    Do I need to fast before going in for blood work? If so, for how long?    Thanks.

## 2024-10-02 ENCOUNTER — LAB ENCOUNTER (OUTPATIENT)
Dept: LAB | Age: 59
End: 2024-10-02
Attending: INTERNAL MEDICINE
Payer: COMMERCIAL

## 2024-10-02 DIAGNOSIS — E66.9 TYPE 2 DIABETES MELLITUS WITH OBESITY (HCC): ICD-10-CM

## 2024-10-02 DIAGNOSIS — E11.69 TYPE 2 DIABETES MELLITUS WITH OBESITY (HCC): ICD-10-CM

## 2024-10-02 DIAGNOSIS — E78.5 HYPERLIPIDEMIA, UNSPECIFIED HYPERLIPIDEMIA TYPE: ICD-10-CM

## 2024-10-02 LAB
ALBUMIN SERPL-MCNC: 4.4 G/DL (ref 3.2–4.8)
ALBUMIN/GLOB SERPL: 1.6 {RATIO} (ref 1–2)
ALP LIVER SERPL-CCNC: 76 U/L
ALT SERPL-CCNC: 34 U/L
ANION GAP SERPL CALC-SCNC: 7 MMOL/L (ref 0–18)
AST SERPL-CCNC: 25 U/L (ref ?–34)
BILIRUB SERPL-MCNC: 0.8 MG/DL (ref 0.3–1.2)
BUN BLD-MCNC: 16 MG/DL (ref 9–23)
BUN/CREAT SERPL: 14.2 (ref 10–20)
CALCIUM BLD-MCNC: 9.7 MG/DL (ref 8.7–10.4)
CHLORIDE SERPL-SCNC: 105 MMOL/L (ref 98–112)
CHOLEST SERPL-MCNC: 151 MG/DL (ref ?–200)
CO2 SERPL-SCNC: 28 MMOL/L (ref 21–32)
CREAT BLD-MCNC: 1.13 MG/DL
CREAT UR-SCNC: 122.5 MG/DL
EGFRCR SERPLBLD CKD-EPI 2021: 75 ML/MIN/1.73M2 (ref 60–?)
EST. AVERAGE GLUCOSE BLD GHB EST-MCNC: 131 MG/DL (ref 68–126)
FASTING PATIENT LIPID ANSWER: YES
FASTING STATUS PATIENT QL REPORTED: YES
GLOBULIN PLAS-MCNC: 2.7 G/DL (ref 2–3.5)
GLUCOSE BLD-MCNC: 127 MG/DL (ref 70–99)
HBA1C MFR BLD: 6.2 % (ref ?–5.7)
HDLC SERPL-MCNC: 42 MG/DL (ref 40–59)
LDLC SERPL CALC-MCNC: 90 MG/DL (ref ?–100)
MICROALBUMIN UR-MCNC: <0.3 MG/DL
NONHDLC SERPL-MCNC: 109 MG/DL (ref ?–130)
OSMOLALITY SERPL CALC.SUM OF ELEC: 293 MOSM/KG (ref 275–295)
POTASSIUM SERPL-SCNC: 4.3 MMOL/L (ref 3.5–5.1)
PROT SERPL-MCNC: 7.1 G/DL (ref 5.7–8.2)
SODIUM SERPL-SCNC: 140 MMOL/L (ref 136–145)
TRIGL SERPL-MCNC: 103 MG/DL (ref 30–149)
VLDLC SERPL CALC-MCNC: 17 MG/DL (ref 0–30)

## 2024-10-02 PROCEDURE — 82043 UR ALBUMIN QUANTITATIVE: CPT | Performed by: INTERNAL MEDICINE

## 2024-10-02 PROCEDURE — 82570 ASSAY OF URINE CREATININE: CPT | Performed by: INTERNAL MEDICINE

## 2024-10-02 PROCEDURE — 80053 COMPREHEN METABOLIC PANEL: CPT | Performed by: INTERNAL MEDICINE

## 2024-10-02 PROCEDURE — 83036 HEMOGLOBIN GLYCOSYLATED A1C: CPT | Performed by: INTERNAL MEDICINE

## 2024-10-02 PROCEDURE — 80061 LIPID PANEL: CPT | Performed by: INTERNAL MEDICINE

## 2024-12-09 ENCOUNTER — OFFICE VISIT (OUTPATIENT)
Dept: INTERNAL MEDICINE CLINIC | Facility: CLINIC | Age: 59
End: 2024-12-09
Payer: COMMERCIAL

## 2024-12-09 VITALS
HEIGHT: 76 IN | WEIGHT: 272.38 LBS | HEART RATE: 86 BPM | BODY MASS INDEX: 33.17 KG/M2 | OXYGEN SATURATION: 98 % | SYSTOLIC BLOOD PRESSURE: 134 MMHG | DIASTOLIC BLOOD PRESSURE: 76 MMHG | RESPIRATION RATE: 18 BRPM

## 2024-12-09 DIAGNOSIS — I10 PRIMARY HYPERTENSION: ICD-10-CM

## 2024-12-09 DIAGNOSIS — E11.69 TYPE 2 DIABETES MELLITUS WITH OBESITY (HCC): ICD-10-CM

## 2024-12-09 DIAGNOSIS — M54.2 NECK PAIN ON LEFT SIDE: ICD-10-CM

## 2024-12-09 DIAGNOSIS — M79.2 RADICULAR PAIN IN LEFT ARM: Primary | ICD-10-CM

## 2024-12-09 DIAGNOSIS — E66.9 TYPE 2 DIABETES MELLITUS WITH OBESITY (HCC): ICD-10-CM

## 2024-12-09 PROCEDURE — 3061F NEG MICROALBUMINURIA REV: CPT | Performed by: INTERNAL MEDICINE

## 2024-12-09 PROCEDURE — 3078F DIAST BP <80 MM HG: CPT | Performed by: INTERNAL MEDICINE

## 2024-12-09 PROCEDURE — 3008F BODY MASS INDEX DOCD: CPT | Performed by: INTERNAL MEDICINE

## 2024-12-09 PROCEDURE — 3044F HG A1C LEVEL LT 7.0%: CPT | Performed by: INTERNAL MEDICINE

## 2024-12-09 PROCEDURE — 99214 OFFICE O/P EST MOD 30 MIN: CPT | Performed by: INTERNAL MEDICINE

## 2024-12-09 PROCEDURE — 3075F SYST BP GE 130 - 139MM HG: CPT | Performed by: INTERNAL MEDICINE

## 2024-12-09 RX ORDER — METHYLPREDNISOLONE 4 MG/1
TABLET ORAL
Qty: 21 TABLET | Refills: 0 | Status: SHIPPED | OUTPATIENT
Start: 2024-12-09

## 2024-12-09 NOTE — PROGRESS NOTES
Bart Faustin male 59 year old         Chief Complaint   Patient presents with    Neck Pain     Pt states that he is having left sided neck pain, Pt states that pain started about a month ago and states that he I having Numbness/ Tingling in left hand in fingers 1-2 going on for a few weeks now    Left  sided  neck pain  for  one  month  - one day after  pickining  up attic  stuff -   stiff  and   sore  - constant  for  few   days  still has pain  intermitant       Has tried advil     cbd cream      Tingling  down  left arm no pain  -  intermittnat   -  no clumsiness  -in to  inex and  thumb       Recent labs great  DM diet controlled      No cp or  sob  - on  lipitor     Bp  good  - losartan, metoprolol       Patient Active Problem List   Diagnosis    Essential tremor    Obstructive apnea    Type 2 diabetes mellitus with obesity (HCC)    Atrial fibrillation with rapid ventricular response (HCC)    Hyperlipidemia          Current Outpatient Medications on File Prior to Visit   Medication Sig Dispense Refill    METOPROLOL SUCCINATE ER 25 MG Oral Tablet 24 Hr TAKE 1 TABLET, 25MG DAILY. 90 tablet 0    atorvastatin 10 MG Oral Tab Take 1 tablet (10 mg total) by mouth nightly. 90 tablet 3    losartan 50 MG Oral Tab Take 1 tablet (50 mg total) by mouth daily. 90 tablet 3    Multiple Vitamin (ONE-DAILY MULTI VITAMINS) Oral Tab Take 1 tablet by mouth daily.       No current facility-administered medications on file prior to visit.          Vitals:    12/09/24 1626   BP: 134/76   Pulse: 86   Resp: 18   VITALSBody mass index is 33.16 kg/m².    Pertinent findings on the physical exam; spurling  neg  rom  ok  -  strength nl  reflex  nl  bp great  cor reg      Bart was seen today for neck pain.    Diagnoses and all orders for this visit:    Radicular pain in left arm  -     XR CERVICAL SPINE (4VIEWS) (CPT=72050); Future  -     Physical Therapy Referral - [facility] [place]    Neck pain on left side  -     XR  CERVICAL SPINE (4VIEWS) (CPT=72050); Future  -     Physical Therapy Referral - [place] Locations    Type 2 diabetes mellitus with obesity (HCC)    Primary hypertension    Other orders  -     methylPREDNISolone (MEDROL) 4 MG Oral Tablet Therapy Pack; As directed.         Discussed radicular pain  and  dif dx   = treatment  with  steroids  and  PT -  if not better mri  -  see back 6  weeks  sooner if  worse     DM good control      Bp at goal     Note  has  hx of afib  30 day monitor  neg  -  not on  eliquis -  per  EP - note  reviewed -              This note was prepared using Dragon Medical voice recognition dictation software and as a result, errors may occur. When identified, these errors have been corrected. While every attempt is made to correct errors during dictation, discrepancies may still exist

## 2025-01-07 ENCOUNTER — OFFICE VISIT (OUTPATIENT)
Dept: INTERNAL MEDICINE CLINIC | Facility: CLINIC | Age: 60
End: 2025-01-07
Payer: COMMERCIAL

## 2025-01-07 VITALS
BODY MASS INDEX: 34.42 KG/M2 | HEIGHT: 76 IN | HEART RATE: 91 BPM | RESPIRATION RATE: 18 BRPM | OXYGEN SATURATION: 97 % | SYSTOLIC BLOOD PRESSURE: 118 MMHG | DIASTOLIC BLOOD PRESSURE: 76 MMHG | WEIGHT: 282.63 LBS

## 2025-01-07 DIAGNOSIS — E66.9 TYPE 2 DIABETES MELLITUS WITH OBESITY (HCC): ICD-10-CM

## 2025-01-07 DIAGNOSIS — G47.33 OBSTRUCTIVE APNEA: ICD-10-CM

## 2025-01-07 DIAGNOSIS — E11.69 TYPE 2 DIABETES MELLITUS WITH OBESITY (HCC): ICD-10-CM

## 2025-01-07 DIAGNOSIS — M79.2 RADICULAR PAIN IN LEFT ARM: Primary | ICD-10-CM

## 2025-01-07 PROCEDURE — 99214 OFFICE O/P EST MOD 30 MIN: CPT | Performed by: INTERNAL MEDICINE

## 2025-01-07 PROCEDURE — 3078F DIAST BP <80 MM HG: CPT | Performed by: INTERNAL MEDICINE

## 2025-01-07 PROCEDURE — 3074F SYST BP LT 130 MM HG: CPT | Performed by: INTERNAL MEDICINE

## 2025-01-07 PROCEDURE — 3008F BODY MASS INDEX DOCD: CPT | Performed by: INTERNAL MEDICINE

## 2025-01-07 RX ORDER — METHYLPREDNISOLONE 4 MG/1
TABLET ORAL
Qty: 21 TABLET | Refills: 0 | Status: SHIPPED | OUTPATIENT
Start: 2025-01-07

## 2025-01-07 NOTE — PROGRESS NOTES
Bart Faustin male 59 year old       Chief complaint:  neck pain  with  radicular  signs     Now in therapy  -helping   no neck pain  -still int  arm pain and tingling -  no traction  at this time  - medrol  helped       No  weakness or motor deficit      Diabetes discussed ; control , last a1c, diet, no current  meds, lifestyle     Lab Results   Component Value Date     (H) 10/02/2024    A1C 6.2 (H) 10/02/2024       Has no cardiac symptoms                          Patient Active Problem List   Diagnosis    Essential tremor    Obstructive apnea    Type 2 diabetes mellitus with obesity (HCC)    Atrial fibrillation with rapid ventricular response (HCC)    Hyperlipidemia          Current Outpatient Medications:     methylPREDNISolone (MEDROL) 4 MG Oral Tablet Therapy Pack, As directed., Disp: 21 tablet, Rfl: 0    METOPROLOL SUCCINATE ER 25 MG Oral Tablet 24 Hr, TAKE 1 TABLET, 25MG DAILY., Disp: 90 tablet, Rfl: 0    atorvastatin 10 MG Oral Tab, Take 1 tablet (10 mg total) by mouth nightly., Disp: 90 tablet, Rfl: 3    losartan 50 MG Oral Tab, Take 1 tablet (50 mg total) by mouth daily., Disp: 90 tablet, Rfl: 3    Multiple Vitamin (ONE-DAILY MULTI VITAMINS) Oral Tab, Take 1 tablet by mouth daily., Disp: , Rfl:      Vitals:    01/07/25 1636   BP: 118/76   Pulse: 91   Resp: 18   VITALSBody mass index is 34.4 kg/m².    neuro   and  cognition are nl   lungs clear   Cor reg   ?? Pos  spurling    Bilateral barefoote appearance is normal.  Bilateral monofilament/sensation of both feet is normal.  Pedal pulse exam of both lower feet is normal.        Bart was seen today for follow - up.    Diagnoses and all orders for this visit:    Radicular pain in left arm    Type 2 diabetes mellitus with obesity (HCC)  -     Hemoglobin A1C; Future  -     Microalb/Creat Ratio, Random Urine; Future  -     Comp Metabolic Panel (14); Future    Obstructive apnea    Other orders  -     methylPREDNISolone (MEDROL) 4 MG Oral  Tablet Therapy Pack; As directed.       Try traction - and  repeat medrol dose sergey        If not better in one month  get MRI      Diabetes management   currently at goal   continue present meds  discussed dietary compliance and exercise  will check labs          Remote history of atrial fibrillation.  Not on any anticoagulation.  Today's heart is regular                         This note was prepared using Dragon Medical voice recognition dictation software and as a result, errors may occur. When identified, these errors have been corrected. While every attempt is made to correct errors during dictation, discrepancies may still exist

## 2025-01-24 RX ORDER — METOPROLOL SUCCINATE 25 MG/1
TABLET, EXTENDED RELEASE ORAL
Qty: 90 TABLET | Refills: 0 | Status: SHIPPED | OUTPATIENT
Start: 2025-01-24

## 2025-02-25 ENCOUNTER — PATIENT MESSAGE (OUTPATIENT)
Age: 60
End: 2025-02-25

## 2025-03-04 ENCOUNTER — OFFICE VISIT (OUTPATIENT)
Age: 60
End: 2025-03-04
Payer: COMMERCIAL

## 2025-03-04 VITALS
OXYGEN SATURATION: 97 % | DIASTOLIC BLOOD PRESSURE: 70 MMHG | WEIGHT: 294 LBS | SYSTOLIC BLOOD PRESSURE: 114 MMHG | BODY MASS INDEX: 35.8 KG/M2 | HEIGHT: 76 IN | HEART RATE: 76 BPM

## 2025-03-04 DIAGNOSIS — M79.2 RADICULAR PAIN IN LEFT ARM: Primary | ICD-10-CM

## 2025-03-04 DIAGNOSIS — M54.2 NECK PAIN OF OVER 3 MONTHS DURATION: ICD-10-CM

## 2025-03-04 PROCEDURE — 99213 OFFICE O/P EST LOW 20 MIN: CPT | Performed by: INTERNAL MEDICINE

## 2025-03-04 NOTE — PROGRESS NOTES
Bart Faustin male 59 year old       Chief complaint:neck pain  - left sided  radicular  sx        Sxs  started in nov -   has done  PT  for  6 weeks  :  taction  stretching  -  had  sl  pain relief in neck  -  bi=ut  left arm  tingling  never went away       Still  ocass pain in left neck  and  more regular  tingling  in left  dorsal forearm  and into fingers      If sleeps certain way  neck pain -=  or looks up                                   Patient Active Problem List   Diagnosis    Essential tremor    Obstructive apnea    Type 2 diabetes mellitus with obesity (HCC)    Atrial fibrillation with rapid ventricular response (HCC)    Hyperlipidemia        Current Outpatient Medications   Medication Sig Dispense Refill    METOPROLOL SUCCINATE ER 25 MG Oral Tablet 24 Hr TAKE 1 TABLET, 25MG DAILY. 90 tablet 0    atorvastatin 10 MG Oral Tab Take 1 tablet (10 mg total) by mouth nightly. 90 tablet 3    losartan 50 MG Oral Tab Take 1 tablet (50 mg total) by mouth daily. 90 tablet 3    Multiple Vitamin (ONE-DAILY MULTI VITAMINS) Oral Tab Take 1 tablet by mouth daily.      methylPREDNISolone (MEDROL) 4 MG Oral Tablet Therapy Pack As directed. (Patient not taking: Reported on 3/4/2025) 21 tablet 0        Vitals:    03/04/25 1244   BP: 114/70   Pulse: 76   VITALSBody mass index is 35.79 kg/m².      Neuro exam  nl -  no atrophy   - has  pain in  neck with  trying to  resist  extended  arm pressure -   spurling test  positive   strength nl  - sensation  nl     Reflexs  Equivocal           Bart was seen today for follow - up.    Diagnoses and all orders for this visit:    Radicular pain in left arm  -     MRI SPINE CERVICAL (CPT=72141); Future  -     Neurosurgery Referral - In Network    Neck pain of over 3 months duration  -     MRI SPINE CERVICAL (CPT=72141); Future  -     Neurosurgery Referral - In Network       He has  persistant  sxs -  positive  spurling  sign  I believe   consistant  with  cervical disc   disease  -  has  done therapy >6 weeks -  s/p  steroids  x  2   and  not  improved - will  order  MRI  and  refer to  neuro surgery                                      This note was prepared using Dragon Medical voice recognition dictation software and as a result, errors may occur. When identified, these errors have been corrected. While every attempt is made to correct errors during dictation, discrepancies may still exist

## 2025-03-21 ENCOUNTER — HOSPITAL ENCOUNTER (OUTPATIENT)
Dept: MRI IMAGING | Age: 60
Discharge: HOME | End: 2025-03-21
Attending: INTERNAL MEDICINE
Payer: COMMERCIAL

## 2025-03-21 DIAGNOSIS — M79.2 RADICULAR PAIN IN LEFT ARM: ICD-10-CM

## 2025-03-21 DIAGNOSIS — M54.2 NECK PAIN OF OVER 3 MONTHS DURATION: ICD-10-CM

## 2025-03-21 PROCEDURE — 72141 MRI NECK SPINE W/O DYE: CPT | Performed by: INTERNAL MEDICINE

## 2025-04-09 ENCOUNTER — OFFICE VISIT (OUTPATIENT)
Dept: SURGERY | Facility: CLINIC | Age: 60
End: 2025-04-09
Payer: COMMERCIAL

## 2025-04-09 VITALS
DIASTOLIC BLOOD PRESSURE: 78 MMHG | HEART RATE: 81 BPM | BODY MASS INDEX: 35.8 KG/M2 | SYSTOLIC BLOOD PRESSURE: 133 MMHG | HEIGHT: 76 IN | WEIGHT: 294 LBS

## 2025-04-09 DIAGNOSIS — G56.12 MEDIAN NEUROPATHY, LEFT: ICD-10-CM

## 2025-04-09 DIAGNOSIS — M48.02 FORAMINAL STENOSIS OF CERVICAL REGION: ICD-10-CM

## 2025-04-09 DIAGNOSIS — M54.12 CERVICAL RADICULOPATHY: ICD-10-CM

## 2025-04-09 DIAGNOSIS — M47.812 CERVICAL SPONDYLOSIS: ICD-10-CM

## 2025-04-09 DIAGNOSIS — M54.2 CERVICALGIA: Primary | ICD-10-CM

## 2025-04-09 PROCEDURE — 3075F SYST BP GE 130 - 139MM HG: CPT | Performed by: STUDENT IN AN ORGANIZED HEALTH CARE EDUCATION/TRAINING PROGRAM

## 2025-04-09 PROCEDURE — 3078F DIAST BP <80 MM HG: CPT | Performed by: STUDENT IN AN ORGANIZED HEALTH CARE EDUCATION/TRAINING PROGRAM

## 2025-04-09 PROCEDURE — 99205 OFFICE O/P NEW HI 60 MIN: CPT | Performed by: STUDENT IN AN ORGANIZED HEALTH CARE EDUCATION/TRAINING PROGRAM

## 2025-04-09 PROCEDURE — 3008F BODY MASS INDEX DOCD: CPT | Performed by: STUDENT IN AN ORGANIZED HEALTH CARE EDUCATION/TRAINING PROGRAM

## 2025-04-09 NOTE — H&P
[facility]  Neurological Surgery New Patient Clinic Note    Bart Faustin  12/6/1965  [id number]  PCP: Jignesh Bass MD  Referring Provider: Jignesh Bass MD    REASON FOR VISIT:  Cervical radiculopathy    HISTORY OF PRESENT ILLNESS 4/9/2025:  Bart Faustin is a(n) 59 year old male who presents for evaluation of a two-month history of left-hand numbness and tingling, which began after moving heavy objects. Initially, he had a stiff neck for approximately one to two weeks but denies any significant radicular pain or persistent neck pain at this time. He notes that the left-hand symptoms are positional, often triggered when sitting or looking upward, and they occasionally improve with certain postures. He reports that physical therapy and a short course of oral steroids provided partial, transient relief. He denies any pronounced weakness or difficulty with fine motor tasks.    Additionally, he describes a recent intermittent burning or tingling along his lateral thigh region that seems positional or related to activity (e.g., after walks). He has no focal lower-extremity weakness or severe lumbar pain. He has not had previous spine surgery and is otherwise here for a new neurosurgical opinion.    PAST MEDICAL HISTORY:  Past Medical History:    Diabetes mellitus type 2 in obese    Essential tremor    Hx of colonoscopy    hyperplastic polyp x 2---12/17    Obstructive apnea    AHI 56 SaO2 nadair 78 % CPAP 12 [facility]    Sleep apnea     PAST SURGICAL HISTORY:  Past Surgical History:   Procedure Laterality Date    Colonoscopy  7/2018    Other      lasik right    Vasectomy  02/27/2009    Dr. Palafox     FAMILY HISTORY:  family history includes Cancer in his father; Depression in his mother.    SOCIAL HISTORY:   reports that he has never smoked. He has never been exposed to tobacco smoke. He has never used smokeless tobacco. He reports current alcohol use of about 4.0  standard drinks of alcohol per week. He reports that he does not use drugs.    ALLERGIES:  Allergies[1]    MEDICATIONS:  Medications Ordered Prior to Encounter[2]    REVIEW OF SYSTEMS:  All other systems were reviewed and were negative except for those previously mentioned in the HPI    PHYSICAL EXAMINATION:  General: No acute distress.  Respiratory: Non-labored respirations bilaterally. No audible wheezing  Cardiovascular: Extremities warm and well-perfused.  Abdomen: Soft, nontender, nondistended.   Musculoskeletal: Moves all extremities well, symmetrically.  Extremities: No edema.    NEUROLOGIC EXAMINATION:  Mental status: Alert and oriented x 3  Speech: Clear, fluent  Cranial nerves: PERRLA, EOMI, face symmetric, with normal strength and sensation, tongue and palate midline, SCM 5/5 bilaterally  Motor:     RIGHT  Delt 5/5   Bic 5/5  Tri 5/5   HI 5/5    5/5  IP 5/5   Quad 5/5   Ham 5/5   AT 5/5   EHL 5/5 Valorie 5/5     LEFT    Delt 5/5   Bic 5/5  Tri 5/5   HI 5/5    5/5  IP 5/5   Quad 5/5   Ham 5/5   AT 5/5   EHL 5/5 Valorie 5/5   No pronator drift  Tone: Normal  Atrophy/Fasciculations: None  Sensation: Intact to light touch throughout all four extremities; patient subjectively reports mild tingling in the left hand when the neck is flexed or extended for a prolonged period. No objective sensory deficit mapped.  Cerebellar: Normal finger nose finger  Gait: Normal, nondistressed heel toe tandem gait      Reflexes: 2+ throughout, symmetric, no Salo's    IMAGING:  MR cervical 3/21/2025: Shows diffuse cervical spondylosis with most prominent degenerative changes at C5-6 and C6-7. Mild to moderate left foraminal stenosis at C5-6, moderate right foraminal stenosis at C6-7. Congenitally narrow canal but no direct spinal cord compression; no abnormal cord signal. Minimal posterior disc-osteophyte complexes at C5-6 and C6-7 with slight ventral cord indentation but no betty cord compression. Overall, there is no  definitive severe left-sided neuroforaminal impingement that clearly explains his left-hand symptoms, though mild to moderate narrowing is seen on the left at C5-6.        ASSESSMENT:  Bart Faustin is a 59-year-old male with mild cervical spondylosis, congenital canal narrowing, and imaging findings of mild/moderate left C5-6 foraminal stenosis but more significant right-sided C6-7 changes. Clinically, his primary complaint is left-hand numbness/tingling, positional in nature, with no pronounced weakness or neck pain at present. These findings raise a differential diagnosis that includes mild cervical radiculopathy versus a distal entrapment neuropathy such as carpal tunnel syndrome or another upper-extremity nerve compression. Given the mild imaging changes on the left and the incongruent right-sided stenosis, it is possible that the neck findings do not solely explain his symptoms. He also notes intermittent burning in the lateral thigh, which could be musculoskeletal in origin given no clear lumbar radiculopathy on history or exam.    He does not demonstrate myelopathy, nor does he show a severe or progressive radiculopathy justifying immediate surgical intervention. Therefore, further diagnostic clarification--particularly through electrodiagnostic testing and possible targeted injections--would be prudent to determine if the cervical spine is truly the culprit.     PLAN:  - Refer to physiatry for consideration of diagnostic/therapeutic injections (e.g., selective nerve root block at left C6) to localize and potentially alleviate symptoms.  - Consider EMG/Nerve Conduction Studies of the left upper extremity to evaluate for distal nerve entrapment (e.g., carpal tunnel) versus cervical radiculopathy.  - Continue conservative measures (physical therapy, stretching) as tolerated for neck and possible IT band irritation in the leg.  - Advised patient to monitor for any progression of weakness or coordination  deficits; return promptly for re-evaluation if new neurological symptoms arise.     Rusty Mccabe MD  Neurological Surgery    [facility]  [address], [address]  [place] [address]  [phone]  Pager [phone]  4/9/2025 9:55 AM      This note was created using a voice-recognition transcribing system. Incorrect words or phrases may have been missed during proofreading. Please interpret accordingly.    Total Time    New Patient Total Time       60  minutes.      Activities       Preparing to see the patient (chart/tests/imaging review).       Obtaining and/or reviewing separately obtained history.       Performing a medically appropriate examination and/or evaluation.       Counseling and educating the patient/family/caregiver.       Ordering medications, tests, or procedures.       Referring and communicating with other health care professionals (when not separately reported).       Documenting clincal information in the electronic or other health record.       Independently interpreting results (not separately reported).    Communicating results to the patient/family/caregiver.    Care coordination (not separately reported).       [1] No Known Allergies  [2]   Current Outpatient Medications on File Prior to Visit   Medication Sig Dispense Refill    METOPROLOL SUCCINATE ER 25 MG Oral Tablet 24 Hr TAKE 1 TABLET, 25MG DAILY. 90 tablet 0    methylPREDNISolone (MEDROL) 4 MG Oral Tablet Therapy Pack As directed. (Patient not taking: Reported on 3/4/2025) 21 tablet 0    atorvastatin 10 MG Oral Tab Take 1 tablet (10 mg total) by mouth nightly. 90 tablet 3    losartan 50 MG Oral Tab Take 1 tablet (50 mg total) by mouth daily. 90 tablet 3    Multiple Vitamin (ONE-DAILY MULTI VITAMINS) Oral Tab Take 1 tablet by mouth daily.       No current facility-administered medications on file prior to visit.

## 2025-04-09 NOTE — PROGRESS NOTES
New patient presents for left arm and neck pain/discomfort that is aggravated more when he looks up that has been present since November. Turning to the left is also more uncomfortable for the patient.     New right thigh leg burning has appeared within the last week. Burning does not go beyond the knee.     PCP: Dr. Jignesh Bass    Pain scale: more discomfort than pain at this point  Dist of pain: L > R; N/T in the left arm that radiates into the thumb, index, and middle finger  Meds: none    Prev surgeries: none  Injections: none  PT: yes, Nov/Dec    The following individual(s) verbally consented to be recorded using ambient AI listening technology and understand that they can each withdraw their consent to this listening technology at any point by asking the clinician to turn off or pause the recording:    Patient name: Bart Moyer Ramin

## 2025-04-24 RX ORDER — METOPROLOL SUCCINATE 25 MG/1
TABLET, EXTENDED RELEASE ORAL
Qty: 90 TABLET | Refills: 1 | Status: SHIPPED | OUTPATIENT
Start: 2025-04-24

## 2025-04-29 ENCOUNTER — OFFICE VISIT (OUTPATIENT)
Dept: PHYSICAL MEDICINE AND REHAB | Facility: CLINIC | Age: 60
End: 2025-04-29
Payer: COMMERCIAL

## 2025-04-29 VITALS — HEIGHT: 76 IN | BODY MASS INDEX: 32.88 KG/M2 | WEIGHT: 270 LBS

## 2025-04-29 DIAGNOSIS — M79.651 RIGHT THIGH PAIN: ICD-10-CM

## 2025-04-29 DIAGNOSIS — M54.12 RADICULITIS OF LEFT CERVICAL REGION: Primary | ICD-10-CM

## 2025-04-29 DIAGNOSIS — R20.0 NUMBNESS OF RIGHT ANTERIOR THIGH: ICD-10-CM

## 2025-04-29 PROCEDURE — 99204 OFFICE O/P NEW MOD 45 MIN: CPT | Performed by: PHYSICAL MEDICINE & REHABILITATION

## 2025-04-29 PROCEDURE — 3008F BODY MASS INDEX DOCD: CPT | Performed by: PHYSICAL MEDICINE & REHABILITATION

## 2025-04-29 RX ORDER — GABAPENTIN 300 MG/1
CAPSULE ORAL
Qty: 60 CAPSULE | Refills: 0 | Status: SHIPPED | OUTPATIENT
Start: 2025-04-29

## 2025-04-29 NOTE — H&P
The following individual(s) verbally consented to be recorded using ambient AI listening technology and understand that they can each withdraw their consent to this listening technology at any point by asking the clinician to turn off or pause the recording:    Patient name: Bart Faustin     History and Physical    C/C:   Chief Complaint   Patient presents with    Numbness     New right handed patient ref'd for numbness in his left arm that goes into his 1st 2nd and 3rd digits as well as numbness and burning in his right thigh. Started in November after moving stuff in his attic, noticed pain and stiffness in his neck afterwards and has had intermittent numbness since. Seems to be positional. Did PT without relief. Cervical MRI 3/21/2025. LOP 2/10        History of Present Illness  Bart Faustin is a 59 year old male who presents with tingling in the arm and leg. He was referred by Dr. Bass for evaluation of tingling symptoms.    Symptoms began in November after moving items in the attic, causing neck stiffness followed by tingling down the arm. Physical therapy has reduced symptom frequency, but tingling still occurs randomly, especially when sitting in certain positions. Tingling primarily affects the thumb and index finger, equally in the hand and forearm, without pain or weakness. Symptoms do not disturb sleep.    A burning sensation on the outside of the thigh started about a month ago, occurring randomly, often after walking or standing, and is more pronounced when lying on his side. No back pain or significant neck pain, though stiffness can occur with certain movements.    No prior neck or wrist problems or surgeries on the neck or lower back. He was prescribed a steroid pack during physical therapy, which provided temporary relief. He has not used gabapentin before.    Past Medical History[1]     Pertinent allergies: Allergies[2]     No patient-reported data collected this visit.        Physical exam:  Ht 76\"   Wt 270 lb (122.5 kg)   BMI 32.87 kg/m²      Cervical spine exam:    No neck rash  No ttp cervical paraspinals left. No ttp upper trapezius left  Cervical extension 50 degrees. Cervical flexion 50 degrees, provokes left arm tingling. Cervical rotation to the right 75 degrees. Cervical rotation to the left 75 degrees  Spurling's test + left for left arm tingling  5/5 strength in shoulder abduction, elbow flexion, elbow extension, wrist extension, finger flexion, FDI bilaterally, left ABP  SILT b/l UE C5-T1 distributions  2/4 biceps, brachioradialis, triceps reflexes b/l  Maher test negative b/l  Tinel's test negative left    Lumbar spine exam:    No skin rash lumbar/upper sacral region  No pain with lumbar flexion. No pain with lumbar extension  5/5 LE strength b/l in HF, KE, ADF, ankle eversion, ankle inversion, PF  SILT b/l LE  2/4 quad, gastrocs reflexes b/l  Facet loading - b/l  SLR + right leg numbness  No pain with resisted right thigh abduction  Minimal/no ttp right proximal, lateral hip    IMAGING: MRI cervical spine dated 3/21/25 Independently reviewed, as was reports.  No abnormal signal of the visualized spinal cord.  Small left foraminal disc protrusion at C5-C6, with resulting mild foraminal stenosis.  Small, diffuse disc bulge at C6-C7 with right greater than left foraminal stenosis    Assessment & Plan  Cervical disc disorder with radiculopathy    He experiences intermittent right arm and hand tingling, worsened by certain neck positions, suggesting cervical radiculopathy. Start gabapentin 300mg at nighttime dosing for one week, increasing to twice daily if tolerated and necessary. If symptoms persist, options are going to be either C7-T1 interlaminar epidural steroid injection under fluoroscopy, or consider selective nerve root block that would have to be done by one of my partners, if necessary. An EMG could also be considered to see if there is electrodiagnostic  evidence of a left C6 radiculopathy, or carpal tunnel syndrome.    Numbness, pain right lateral thigh  He has intermittent burning and numbness in the right lateral thigh; possible etiologies include trochanteric bursitis, lumbar radiculopathy or meralgia paresthetica. Gabapentin may help alleviate these symptoms. Bursitis is also considered, and if symptoms persist could consider bursa injection is discussed for both diagnostic and therapeutic purposes. Order x-rays of the lumbar spine with flexion and extension views.     He will follow up in 4 to 6 weeks.  Depending on his status we could consider further workup of the cervical spine versus spinal interventional procedures; right trochanteric bursa steroid injection under ultrasound guidance.    Frederic Little DO  Physical Medicine and Rehabilitation  [facility]         [1]   Past Medical History:   Chronic atrial fibrillation (HCC)    Diabetes mellitus type 2 in obese    Essential tremor    Hx of colonoscopy    hyperplastic polyp x 2---12/17    Obstructive apnea    AHI 56 SaO2 nadair 78 % CPAP 12 [facility]    Sleep apnea   [2] No Known Allergies

## 2025-05-26 DIAGNOSIS — R20.0 NUMBNESS OF RIGHT ANTERIOR THIGH: ICD-10-CM

## 2025-05-26 DIAGNOSIS — M79.651 RIGHT THIGH PAIN: ICD-10-CM

## 2025-05-26 DIAGNOSIS — M54.12 RADICULITIS OF LEFT CERVICAL REGION: ICD-10-CM

## 2025-05-28 RX ORDER — GABAPENTIN 300 MG/1
300 CAPSULE ORAL 2 TIMES DAILY
Qty: 60 CAPSULE | Refills: 0 | Status: SHIPPED | OUTPATIENT
Start: 2025-05-28

## 2025-06-03 ENCOUNTER — OFFICE VISIT (OUTPATIENT)
Dept: PHYSICAL MEDICINE AND REHAB | Facility: CLINIC | Age: 60
End: 2025-06-03
Payer: COMMERCIAL

## 2025-06-03 VITALS — BODY MASS INDEX: 32.88 KG/M2 | WEIGHT: 270 LBS | HEIGHT: 76 IN

## 2025-06-03 DIAGNOSIS — R20.0 NUMBNESS OF RIGHT ANTERIOR THIGH: ICD-10-CM

## 2025-06-03 DIAGNOSIS — M54.12 RADICULITIS OF LEFT CERVICAL REGION: Primary | ICD-10-CM

## 2025-06-03 DIAGNOSIS — M79.651 RIGHT THIGH PAIN: ICD-10-CM

## 2025-06-03 PROCEDURE — 3008F BODY MASS INDEX DOCD: CPT | Performed by: PHYSICAL MEDICINE & REHABILITATION

## 2025-06-03 PROCEDURE — 99214 OFFICE O/P EST MOD 30 MIN: CPT | Performed by: PHYSICAL MEDICINE & REHABILITATION

## 2025-06-03 RX ORDER — LOSARTAN POTASSIUM 50 MG/1
50 TABLET ORAL DAILY
Qty: 90 TABLET | Refills: 3 | Status: SHIPPED | OUTPATIENT
Start: 2025-06-03

## 2025-06-03 RX ORDER — GABAPENTIN 300 MG/1
300 CAPSULE ORAL 3 TIMES DAILY
Qty: 90 CAPSULE | Refills: 0 | Status: SHIPPED | OUTPATIENT
Start: 2025-06-03

## 2025-06-03 NOTE — PROGRESS NOTES
The following individual(s) verbally consented to be recorded using ambient AI listening technology and understand that they can each withdraw their consent to this listening technology at any point by asking the clinician to turn off or pause the recording:    Patient name: Bart Faustin     Progress note    C/C:   Chief Complaint   Patient presents with    Arm Pain     LOV: 4/29/2025 Patient f/u on left arm N/T.  Takes Gabapentin with relief.       Leg Pain     C/o Right leg numbness and tingling.  XR not completed.         History of Present Illness  Bart Faustin is a 59 year old male who presents with tingling and burning sensations in his left arm and right leg.    Tingling in the arm began in November after moving heavy items, initially as tightness, progressing to tingling in the thumb and index finger without weakness. Burning in the right thigh started in February, intermittent, and triggered by walking or laying down. Gabapentin is taken twice daily, with symptom reduction noted upon resumption after a brief discontinuation. While off the gabapentin both the left arm and right thigh symptoms went back to baseline. No significant side effects from gabapentin, though lightheadedness occurred when taken with other medications.     A lump on the right elbow is also present, with no pain or weakness, and ice is applied. Tends to lean on the right elbow at work. Recent activities include playing softball, but no correlation with symptoms is noted. Sleep is unaffected by symptoms.    Pertinent allergies: Allergies[1]     Physical exam:  Ht 76\"   Wt 270 lb (122.5 kg)   BMI 32.87 kg/m²      Right posterior elbow swelling along the olecranon bursa, without palpation, or overlying skin changes.     Cervical spine exam:    Spurling's test left increases tingling in the left hand  5/5 strength in shoulder abduction, elbow flexion, elbow extension, wrist extension, finger flexion, FDI  bilaterally  SILT b/l UE C5-T1 distributions  2/4 biceps, brachioradialis, triceps reflexes b/l  Maher test negative b/l    SLR right +  No pain with scouring the right hip  2/4 quad, 1/4 gastrocs b/l  Decreased sensation to LT right lateral thigh    IMAGING: No new imaging to review  Assessment & Plan  Left cervical radiculopathy  Cervical disc disorder with radiculopathy since November. Gabapentin is effective, but symptoms recur when paused. Was recommended left C6 selective nerve root block by neurosurgery. Will discuss with neurosurgery; either I can do a C7-T1 interlaminar epidural steroid injection, if going to a nerve root block would have to be done by my partner Dr. Jerez. Risks include bleeding, infection, nerve injury, and headaches. Continue gabapentin in the interim, increasing the dose to 600 mg in the morning and 300 mg at night, or 300 mg three times daily.     Numbness and pain in right lateral thigh    Intermittent burning sensation in the right lateral thigh since February, managed with gabapentin. Continue gabapentin, increasing the dose as per the cervical radiculopathy plan. He should have XR lumbar spine done; if symptoms persist, consider an MRI of the lumbar spine.     Olecranon bursitis    Olecranon bursitis in the right elbow, likely from leaning while working. Swelling is present, but no pain. Conservative management with compression is recommended. Avoid aspiration due to infection risk. Wear a compression sleeve or ACE wrap on the right elbow for 6-8 hours daily, especially while working, for the next 2-3 weeks.    Frederic Little DO  Physical Medicine and Rehabilitation  [facility]         [1] No Known Allergies

## 2025-06-03 NOTE — PATIENT INSTRUCTIONS
Wear either an ACE wrap or a compression sleeve in the right arm for 2-4 weeks, particularly when you are working.    Increase the gabapentin to either 600mg the morning and 300mg at night time, or 300mg three times daily.     We will contact you to get set up for the left cervical transforaminal epidural steroid injection; ok to defer if you prefer.     If your leg continues to bother you please let me know and I will order an MRI of your lower back.

## 2025-06-04 ENCOUNTER — PATIENT MESSAGE (OUTPATIENT)
Dept: PHYSICAL MEDICINE AND REHAB | Facility: CLINIC | Age: 60
End: 2025-06-04

## 2025-06-06 ENCOUNTER — TELEPHONE (OUTPATIENT)
Dept: PHYSICAL MEDICINE AND REHAB | Facility: CLINIC | Age: 60
End: 2025-06-06

## 2025-06-06 DIAGNOSIS — M54.12 RADICULITIS OF LEFT CERVICAL REGION: Primary | ICD-10-CM

## 2025-06-06 NOTE — TELEPHONE ENCOUNTER
Case discussed with Manuel Jerez and Eliot; may benefit from C7-T1 ANANYA under fluoroscopy. Plan is for me to do that procedure, consider EMG if no better. We will hold off on cervical transforaminal ANANYA.

## 2025-06-09 ENCOUNTER — TELEPHONE (OUTPATIENT)
Dept: PHYSICAL MEDICINE AND REHAB | Facility: CLINIC | Age: 60
End: 2025-06-09

## 2025-06-09 NOTE — TELEPHONE ENCOUNTER
Initiated Left Paramedian C7-T1 Interlaminar Epidural Steroid Injection with CPT Code: 24462 & Dx: M54.12 to be done at [facility] with site [facility].   Status: Approved  Order ID: [id number]  Approval Valid Through: 06/09/2025 - 06/20/2025

## 2025-06-10 NOTE — TELEPHONE ENCOUNTER
Patient is choosing to postpone the procedure until he feels ready.  He was informed that [employer]/insurance approvals are only valid for two weeks at a time.  He was advised to contact the office when ready to reschedule so we can re-initiate the authorization process.    Closing encounter until patient returns call.

## 2025-06-19 ENCOUNTER — HOSPITAL ENCOUNTER (OUTPATIENT)
Dept: GENERAL RADIOLOGY | Facility: HOSPITAL | Age: 60
Discharge: HOME | End: 2025-06-19
Attending: PHYSICAL MEDICINE & REHABILITATION
Payer: COMMERCIAL

## 2025-06-19 ENCOUNTER — LAB ENCOUNTER (OUTPATIENT)
Dept: LAB | Facility: HOSPITAL | Age: 60
End: 2025-06-19
Attending: PHYSICAL MEDICINE & REHABILITATION
Payer: COMMERCIAL

## 2025-06-19 DIAGNOSIS — R20.0 NUMBNESS OF RIGHT ANTERIOR THIGH: ICD-10-CM

## 2025-06-19 DIAGNOSIS — M79.651 RIGHT THIGH PAIN: ICD-10-CM

## 2025-06-19 DIAGNOSIS — E11.69 TYPE 2 DIABETES MELLITUS WITH OBESITY (HCC): ICD-10-CM

## 2025-06-19 DIAGNOSIS — E66.9 TYPE 2 DIABETES MELLITUS WITH OBESITY (HCC): ICD-10-CM

## 2025-06-19 LAB
ALBUMIN SERPL-MCNC: 4.8 G/DL (ref 3.2–4.8)
ALBUMIN/GLOB SERPL: 2.3 {RATIO} (ref 1–2)
ALP LIVER SERPL-CCNC: 85 U/L (ref 45–117)
ALT SERPL-CCNC: 24 U/L (ref 10–49)
ANION GAP SERPL CALC-SCNC: 4 MMOL/L (ref 0–18)
AST SERPL-CCNC: 20 U/L (ref ?–34)
BILIRUB SERPL-MCNC: 0.6 MG/DL (ref 0.3–1.2)
BUN BLD-MCNC: 16 MG/DL (ref 9–23)
BUN/CREAT SERPL: 15.2 (ref 10–20)
CALCIUM BLD-MCNC: 9.6 MG/DL (ref 8.7–10.4)
CHLORIDE SERPL-SCNC: 105 MMOL/L (ref 98–112)
CO2 SERPL-SCNC: 28 MMOL/L (ref 21–32)
CREAT BLD-MCNC: 1.05 MG/DL (ref 0.7–1.3)
CREAT UR-SCNC: 104.4 MG/DL
EGFRCR SERPLBLD CKD-EPI 2021: 82 ML/MIN/1.73M2 (ref 60–?)
EST. AVERAGE GLUCOSE BLD GHB EST-MCNC: 128 MG/DL (ref 68–126)
FASTING STATUS PATIENT QL REPORTED: NO
GLOBULIN PLAS-MCNC: 2.1 G/DL (ref 2–3.5)
GLUCOSE BLD-MCNC: 112 MG/DL (ref 70–99)
HBA1C MFR BLD: 6.1 % (ref ?–5.7)
MICROALBUMIN UR-MCNC: <0.3 MG/DL
OSMOLALITY SERPL CALC.SUM OF ELEC: 286 MOSM/KG (ref 275–295)
POTASSIUM SERPL-SCNC: 4.4 MMOL/L (ref 3.5–5.1)
PROT SERPL-MCNC: 6.9 G/DL (ref 5.7–8.2)
SODIUM SERPL-SCNC: 137 MMOL/L (ref 136–145)

## 2025-06-19 PROCEDURE — 72110 X-RAY EXAM L-2 SPINE 4/>VWS: CPT | Performed by: PHYSICAL MEDICINE & REHABILITATION

## 2025-06-19 PROCEDURE — 83036 HEMOGLOBIN GLYCOSYLATED A1C: CPT | Performed by: INTERNAL MEDICINE

## 2025-06-19 PROCEDURE — 82043 UR ALBUMIN QUANTITATIVE: CPT | Performed by: INTERNAL MEDICINE

## 2025-06-19 PROCEDURE — 80053 COMPREHEN METABOLIC PANEL: CPT | Performed by: INTERNAL MEDICINE

## 2025-06-19 PROCEDURE — 82570 ASSAY OF URINE CREATININE: CPT | Performed by: INTERNAL MEDICINE

## 2025-06-23 ENCOUNTER — PATIENT MESSAGE (OUTPATIENT)
Dept: PHYSICAL MEDICINE AND REHAB | Facility: CLINIC | Age: 60
End: 2025-06-23

## 2025-06-24 ENCOUNTER — PATIENT MESSAGE (OUTPATIENT)
Dept: PHYSICAL MEDICINE AND REHAB | Facility: CLINIC | Age: 60
End: 2025-06-24

## 2025-06-30 DIAGNOSIS — M79.651 RIGHT THIGH PAIN: ICD-10-CM

## 2025-06-30 DIAGNOSIS — M54.12 RADICULITIS OF LEFT CERVICAL REGION: ICD-10-CM

## 2025-06-30 DIAGNOSIS — R20.0 NUMBNESS OF RIGHT ANTERIOR THIGH: ICD-10-CM

## 2025-06-30 RX ORDER — GABAPENTIN 300 MG/1
CAPSULE ORAL
Qty: 60 CAPSULE | Refills: 0 | OUTPATIENT
Start: 2025-06-30

## 2025-06-30 NOTE — TELEPHONE ENCOUNTER
Refill Request    LAST GFR: 06/19/2025; 82. PROTOCOL PASSED.     Medication request: gabapentin 300 MG Oral Cap.  Take 1 capsule (300 mg total) by mouth in the morning, at noon, and at bedtime.     LOV: 6/3/2025 Frederic Little DO   Due back to clinic per last office note: Per Dr. Little: \"We will contact you to get set up for the left cervical transforaminal epidural steroid injection; ok to defer if you prefer.   If your leg continues to bother you please let me know and I will order an MRI of your lower back.\"   NOV: Visit date not found   Patient deferred injection.      [place]: 06/23/2025 #90    Urine drug screen (if applicable): n/a  Pain contract: n/a    LOV plan (if weaning or changing medications): Per Dr. Little: \"Increase the gabapentin to either 600mg the morning and 300mg at night time, or 300mg three times daily. \"   However, in the 2 most recent PM encounters, patient has requested to decrease the Gabapentin to nightly d/t the daytime drowsiness side effects.         Refill requested too early. Patient just received #90 on 06/23/2025 and if patient is only using 1 capsule at night, he has a 90 day supply.

## (undated) DIAGNOSIS — R97.20 PSA ELEVATION: Primary | ICD-10-CM

## (undated) NOTE — LETTER
AUTHORIZATION FOR SURGICAL OPERATION OR OTHER PROCEDURE    1. I hereby authorize Dr. Edgar Love , and [facility] staff assigned to my case to perform the following operation and/or procedure at the [facility], [facility]:    Cortisone injection in Right shoulder   _______________________________________________________________________________________________      _______________________________________________________________________________________________    2. My physician has explained the nature and purpose of the operation or other procedure, possible alternative methods of treatment, the risks involved, and the possibility of complication to me. I acknowledge that no guarantee has been made as to the result that may be obtained. 3.  I recognize that, during the course of this operation, or other procedure, unforseen conditions may necessitate additional or different procedure than those listed above. I, therefore, further authorize and request that the above named physician, his/her physician assistants or designees perform such procedures as are, in his/her professional opinion, necessary and desirable. 4.  Any tissue or organs removed in the operation or other procedure may be disposed of by and at the discretion of the [facility], [facility] and [facility]. 5.  I understand that in the event of a medical emergency, I will be transported by local paramedics to [facility] or other hospital emergency department. 6.  I certify that I have read and fully understand the above consent to operation and/or other procedure. 7.  I acknowledge that my physician has explained sedation/analgesia administration to me including the risks and benefits. I consent to the administration of sedation/analgesia as may be necessary or desirable in the judgement of my physician.     Witness signature: ___________________________________________________ Date: ______/______/_____                    Time:  ________ A. M.  P.M. Patient Name:  ______________________________________________________  (please print)      Patient signature:  ___________________________________________________             Relationship to Patient:           []  Parent    Responsible person                          []  Spouse  In case of minor or                    [] Other  _____________   Incompetent name:  __________________________________________________                               (please print)      _____________      Responsible person  In case of minor or  Incompetent signature:  _______________________________________________    Statement of Physician  My signature below affirms that prior to the time of the procedure, I have explained to the patient and/or his/her guardian, the risks and benefits involved in the proposed treatment and any reasonable alternative to the proposed treatment. I have also explained the risks and benefits involved in the refusal of the proposed treatment and have answered the patient's questions.                         Date:  ______/______/_______  Provider                      Signature:  __________________________________________________________       Time:  ___________ A.M    P.M.

## (undated) NOTE — Clinical Note
TCM assessment completed with patient. Patient declined to schedule TCM appt- patient is looking for a new PCP. A TE has been sent to clinical staff as an FYI/per TCM protocol.  Thank you.

## (undated) NOTE — LETTER
WHERE IS YOUR PAIN NOW?  Jesus Manuel the areas on your body where you feel the described sensations.  Use the appropriate symbol.  Jesus Manuel the areas of radiation.  Include all affected areas.  Just to complete the picture, please draw in the face.     ACHE:  ^ ^ ^   NUMBNESS:  0000   PINS & NEEDLES:  = = = =                              ^ ^ ^                       0000              = = = =                                    ^ ^ ^                       0000            = = = =      BURNING:  XXXX   STABBING: ////                  XXXX                ////                         XXXX          ////     Please jesus manuel the line below indicating your degree of pain right now  with 0 being no pain 10 being the worst pain possible.                                         0             1             2              3             4              5              6              7             8             9             10         Patient Signature:

## (undated) NOTE — ED AVS SNAPSHOT
Aki Sellers   MRN: [id number]    Department:  [facility] Emergency Department   Date of Visit:  12/17/2018           Disclosure     Insurance plans vary and the physician(s) referred by the ER may not be covered by your plan.  Please contact CARE PHYSICIAN AT ONCE OR RETURN IMMEDIATELY TO THE EMERGENCY DEPARTMENT. If you have been prescribed any medication(s), please fill your prescription right away and begin taking the medication(s) as directed.   If you believe that any of the medications